# Patient Record
Sex: FEMALE | Race: AMERICAN INDIAN OR ALASKA NATIVE | NOT HISPANIC OR LATINO | RURAL
[De-identification: names, ages, dates, MRNs, and addresses within clinical notes are randomized per-mention and may not be internally consistent; named-entity substitution may affect disease eponyms.]

---

## 2024-04-30 ENCOUNTER — HOSPITAL ENCOUNTER (INPATIENT)
Facility: HOSPITAL | Age: 29
LOS: 2 days | Discharge: HOME | DRG: 819 | End: 2024-05-02
Attending: OBSTETRICS & GYNECOLOGY | Admitting: OBSTETRICS & GYNECOLOGY
Payer: COMMERCIAL

## 2024-04-30 ENCOUNTER — ROUTINE PRENATAL (OUTPATIENT)
Dept: OBSTETRICS AND GYNECOLOGY | Facility: CLINIC | Age: 29
End: 2024-04-30
Payer: COMMERCIAL

## 2024-04-30 ENCOUNTER — PROCEDURE VISIT (OUTPATIENT)
Dept: OBSTETRICS AND GYNECOLOGY | Facility: CLINIC | Age: 29
End: 2024-04-30
Attending: OBSTETRICS & GYNECOLOGY
Payer: COMMERCIAL

## 2024-04-30 VITALS — DIASTOLIC BLOOD PRESSURE: 79 MMHG | WEIGHT: 255.38 LBS | HEART RATE: 92 BPM | SYSTOLIC BLOOD PRESSURE: 124 MMHG

## 2024-04-30 DIAGNOSIS — O34.32 CERVICAL INCOMPETENCE DURING PREGNANCY IN SECOND TRIMESTER: ICD-10-CM

## 2024-04-30 DIAGNOSIS — O34.32 INCOMPETENT CERVIX IN PREGNANCY, ANTEPARTUM, SECOND TRIMESTER: ICD-10-CM

## 2024-04-30 DIAGNOSIS — Z36.9 ANTENATAL SCREENING ENCOUNTER: ICD-10-CM

## 2024-04-30 DIAGNOSIS — Z3A.27 27 WEEKS GESTATION OF PREGNANCY: ICD-10-CM

## 2024-04-30 DIAGNOSIS — O34.32 CERVICAL CERCLAGE SUTURE PRESENT IN SECOND TRIMESTER: ICD-10-CM

## 2024-04-30 DIAGNOSIS — Z36.9 ANTENATAL SCREENING ENCOUNTER: Primary | ICD-10-CM

## 2024-04-30 DIAGNOSIS — O34.32 PREMATURE CERVICAL DILATION IN SECOND TRIMESTER: Primary | ICD-10-CM

## 2024-04-30 LAB
BACTERIA VAG QL WET PREP: ABNORMAL /HPF
BASOPHILS # BLD AUTO: 0.04 K/UL (ref 0–0.2)
BASOPHILS NFR BLD AUTO: 0.3 % (ref 0–1)
BILIRUB SERPL-MCNC: NORMAL MG/DL
BILIRUB UR QL STRIP: NEGATIVE
BLOOD, POC UA: NORMAL
CLARITY UR: CLEAR
CLUE CELLS VAG QL WET PREP: ABNORMAL /HPF
COLOR UR: YELLOW
DIFFERENTIAL METHOD BLD: ABNORMAL
EOSINOPHIL # BLD AUTO: 0.07 K/UL (ref 0–0.5)
EOSINOPHIL NFR BLD AUTO: 0.6 % (ref 1–4)
ERYTHROCYTE [DISTWIDTH] IN BLOOD BY AUTOMATED COUNT: 13.1 % (ref 11.5–14.5)
GLUCOSE UR QL STRIP: NORMAL
GLUCOSE UR STRIP-MCNC: NORMAL MG/DL
HCT VFR BLD AUTO: 35.2 % (ref 38–47)
HGB BLD-MCNC: 11.1 G/DL (ref 12–16)
IMM GRANULOCYTES # BLD AUTO: 0.24 K/UL (ref 0–0.04)
IMM GRANULOCYTES NFR BLD: 1.9 % (ref 0–0.4)
INDIRECT COOMBS: NORMAL
KETONES UR QL STRIP: NORMAL
KETONES UR STRIP-SCNC: 60 MG/DL
LEUKOCYTE ESTERASE UR QL STRIP: NEGATIVE
LEUKOCYTE ESTERASE URINE, POC: NORMAL
LYMPHOCYTES # BLD AUTO: 1.47 K/UL (ref 1–4.8)
LYMPHOCYTES NFR BLD AUTO: 11.7 % (ref 27–41)
MCH RBC QN AUTO: 28.4 PG (ref 27–31)
MCHC RBC AUTO-ENTMCNC: 31.5 G/DL (ref 32–36)
MCV RBC AUTO: 90 FL (ref 80–96)
MONOCYTES # BLD AUTO: 0.6 K/UL (ref 0–0.8)
MONOCYTES NFR BLD AUTO: 4.8 % (ref 2–6)
MPC BLD CALC-MCNC: 11 FL (ref 9.4–12.4)
NEUTROPHILS # BLD AUTO: 10.11 K/UL (ref 1.8–7.7)
NEUTROPHILS NFR BLD AUTO: 80.7 % (ref 53–65)
NITRITE UR QL STRIP: NEGATIVE
NITRITE, POC UA: NORMAL
NRBC # BLD AUTO: 0 X10E3/UL
NRBC, AUTO (.00): 0 %
PH UR STRIP: 6.5 PH UNITS
PH, POC UA: 6
PLATELET # BLD AUTO: 237 K/UL (ref 150–400)
PROT UR QL STRIP: 20
PROTEIN, POC: NORMAL
RBC # BLD AUTO: 3.91 M/UL (ref 4.2–5.4)
RBC # UR STRIP: NEGATIVE /UL
RBC #/AREA VAG WET PREP: ABNORMAL /HPF
RH BLD: NORMAL
SP GR UR STRIP: 1.03
SPECIFIC GRAVITY, POC UA: 1.01
SPECIMEN OUTDATE: NORMAL
SQUAMOUS EPITHELIALS WET WOUNT, GENITAL: ABNORMAL /HPF
T VAGINALIS VAG QL WET PREP: ABNORMAL /HPF
UROBILINOGEN UR STRIP-ACNC: NORMAL MG/DL
UROBILINOGEN, POC UA: 0.2
WBC # BLD AUTO: 12.53 K/UL (ref 4.5–11)
WBC CLUMPS WET MOUNT, GENITAL: ABNORMAL /HPF
WBC VAG QL WET PREP: ABNORMAL /HPF
YEAST VAG QL WET PREP: ABNORMAL /HPF

## 2024-04-30 PROCEDURE — 76819 FETAL BIOPHYS PROFIL W/O NST: CPT | Mod: ,,, | Performed by: OBSTETRICS & GYNECOLOGY

## 2024-04-30 PROCEDURE — 99285 EMERGENCY DEPT VISIT HI MDM: CPT | Mod: 25

## 2024-04-30 PROCEDURE — 99499 UNLISTED E&M SERVICE: CPT | Mod: ,,, | Performed by: OBSTETRICS & GYNECOLOGY

## 2024-04-30 PROCEDURE — 76805 OB US >/= 14 WKS SNGL FETUS: CPT | Mod: ,,, | Performed by: OBSTETRICS & GYNECOLOGY

## 2024-04-30 PROCEDURE — 85025 COMPLETE CBC W/AUTO DIFF WBC: CPT | Performed by: OBSTETRICS & GYNECOLOGY

## 2024-04-30 PROCEDURE — 81003 URINALYSIS AUTO W/O SCOPE: CPT | Performed by: OBSTETRICS & GYNECOLOGY

## 2024-04-30 PROCEDURE — 87210 SMEAR WET MOUNT SALINE/INK: CPT | Performed by: OBSTETRICS & GYNECOLOGY

## 2024-04-30 PROCEDURE — 11000001 HC ACUTE MED/SURG PRIVATE ROOM

## 2024-04-30 PROCEDURE — 87591 N.GONORRHOEAE DNA AMP PROB: CPT | Performed by: OBSTETRICS & GYNECOLOGY

## 2024-04-30 PROCEDURE — 51702 INSERT TEMP BLADDER CATH: CPT

## 2024-04-30 PROCEDURE — 87653 STREP B DNA AMP PROBE: CPT | Performed by: OBSTETRICS & GYNECOLOGY

## 2024-04-30 PROCEDURE — 63600175 PHARM REV CODE 636 W HCPCS: Performed by: OBSTETRICS & GYNECOLOGY

## 2024-04-30 PROCEDURE — 86850 RBC ANTIBODY SCREEN: CPT | Performed by: OBSTETRICS & GYNECOLOGY

## 2024-04-30 PROCEDURE — 99214 OFFICE O/P EST MOD 30 MIN: CPT | Mod: ,,, | Performed by: OBSTETRICS & GYNECOLOGY

## 2024-04-30 PROCEDURE — 27000577 HC CATH, FOLEY TRAY W/ URINE METER

## 2024-04-30 PROCEDURE — 36415 COLL VENOUS BLD VENIPUNCTURE: CPT | Performed by: OBSTETRICS & GYNECOLOGY

## 2024-04-30 PROCEDURE — 86592 SYPHILIS TEST NON-TREP QUAL: CPT | Performed by: OBSTETRICS & GYNECOLOGY

## 2024-04-30 RX ORDER — PRENATAL WITH FERROUS FUM AND FOLIC ACID 3080; 920; 120; 400; 22; 1.84; 3; 20; 10; 1; 12; 200; 27; 25; 2 [IU]/1; [IU]/1; MG/1; [IU]/1; MG/1; MG/1; MG/1; MG/1; MG/1; MG/1; UG/1; MG/1; MG/1; MG/1; MG/1
1 TABLET ORAL DAILY
Status: DISCONTINUED | OUTPATIENT
Start: 2024-05-01 | End: 2024-05-02 | Stop reason: HOSPADM

## 2024-04-30 RX ORDER — ONDANSETRON HYDROCHLORIDE 2 MG/ML
4 INJECTION, SOLUTION INTRAVENOUS EVERY 6 HOURS PRN
Status: DISCONTINUED | OUTPATIENT
Start: 2024-04-30 | End: 2024-05-02

## 2024-04-30 RX ORDER — MAGNESIUM SULFATE HEPTAHYDRATE 40 MG/ML
4 INJECTION, SOLUTION INTRAVENOUS ONCE
Status: COMPLETED | OUTPATIENT
Start: 2024-04-30 | End: 2024-04-30

## 2024-04-30 RX ORDER — DIPHENHYDRAMINE HYDROCHLORIDE 50 MG/ML
25 INJECTION INTRAMUSCULAR; INTRAVENOUS EVERY 4 HOURS PRN
Status: DISCONTINUED | OUTPATIENT
Start: 2024-04-30 | End: 2024-05-02

## 2024-04-30 RX ORDER — BETAMETHASONE SODIUM PHOSPHATE AND BETAMETHASONE ACETATE 3; 3 MG/ML; MG/ML
12 INJECTION, SUSPENSION INTRA-ARTICULAR; INTRALESIONAL; INTRAMUSCULAR; SOFT TISSUE EVERY 24 HOURS
Status: DISPENSED | OUTPATIENT
Start: 2024-04-30 | End: 2024-05-02

## 2024-04-30 RX ORDER — AMOXICILLIN 250 MG
1 CAPSULE ORAL NIGHTLY PRN
Status: DISCONTINUED | OUTPATIENT
Start: 2024-04-30 | End: 2024-05-02 | Stop reason: HOSPADM

## 2024-04-30 RX ORDER — ONDANSETRON 4 MG/1
8 TABLET, ORALLY DISINTEGRATING ORAL EVERY 8 HOURS PRN
Status: DISCONTINUED | OUTPATIENT
Start: 2024-04-30 | End: 2024-05-02

## 2024-04-30 RX ORDER — SIMETHICONE 80 MG
1 TABLET,CHEWABLE ORAL EVERY 6 HOURS PRN
Status: DISCONTINUED | OUTPATIENT
Start: 2024-04-30 | End: 2024-05-02 | Stop reason: HOSPADM

## 2024-04-30 RX ORDER — MAGNESIUM SULFATE HEPTAHYDRATE 40 MG/ML
2 INJECTION, SOLUTION INTRAVENOUS CONTINUOUS
Status: DISCONTINUED | OUTPATIENT
Start: 2024-04-30 | End: 2024-05-02 | Stop reason: HOSPADM

## 2024-04-30 RX ORDER — SODIUM CHLORIDE, SODIUM LACTATE, POTASSIUM CHLORIDE, CALCIUM CHLORIDE 600; 310; 30; 20 MG/100ML; MG/100ML; MG/100ML; MG/100ML
INJECTION, SOLUTION INTRAVENOUS CONTINUOUS
Status: DISCONTINUED | OUTPATIENT
Start: 2024-04-30 | End: 2024-05-02 | Stop reason: HOSPADM

## 2024-04-30 RX ORDER — SODIUM CHLORIDE 0.9 % (FLUSH) 0.9 %
10 SYRINGE (ML) INJECTION
Status: DISCONTINUED | OUTPATIENT
Start: 2024-04-30 | End: 2024-05-02 | Stop reason: HOSPADM

## 2024-04-30 RX ORDER — CALCIUM GLUCONATE 98 MG/ML
1 INJECTION, SOLUTION INTRAVENOUS
Status: DISCONTINUED | OUTPATIENT
Start: 2024-04-30 | End: 2024-05-02 | Stop reason: HOSPADM

## 2024-04-30 RX ORDER — DIPHENHYDRAMINE HCL 25 MG
25 CAPSULE ORAL EVERY 4 HOURS PRN
Status: DISCONTINUED | OUTPATIENT
Start: 2024-04-30 | End: 2024-05-02

## 2024-04-30 RX ORDER — ACETAMINOPHEN 325 MG/1
650 TABLET ORAL EVERY 6 HOURS PRN
Status: DISCONTINUED | OUTPATIENT
Start: 2024-04-30 | End: 2024-05-02 | Stop reason: HOSPADM

## 2024-04-30 RX ADMIN — SODIUM CHLORIDE, POTASSIUM CHLORIDE, SODIUM LACTATE AND CALCIUM CHLORIDE: 600; 310; 30; 20 INJECTION, SOLUTION INTRAVENOUS at 05:04

## 2024-04-30 RX ADMIN — MAGNESIUM SULFATE HEPTAHYDRATE 4 G: 40 INJECTION, SOLUTION INTRAVENOUS at 05:04

## 2024-04-30 RX ADMIN — BETAMETHASONE SODIUM PHOSPHATE AND BETAMETHASONE ACETATE 12 MG: 3; 3 INJECTION, SUSPENSION INTRA-ARTICULAR; INTRALESIONAL; INTRAMUSCULAR at 04:04

## 2024-04-30 NOTE — HPI
Admit Diagnosis/Chief Complaint:  Premature cervical dilation  History           Chief Complaint   Patient presents with    Rule out PTL       Patient found to be dilated 2-3cm at clinic visit today. Denies contractions, abdominal pain            Pt of Dr Coleman ([facility])     28yo  at 27w4d sent from office secondary to CL 2.23 with funneling. Cervical exam was 2-3/50/-3.     Patient denies any vaginal bleeding, leakage of fluid or rhythmic contractions  Reports fetal movement     Denies any recent abdominal trauma  Denies abdominal /pelvic pain or cramping  Denies fever, chills, nausea, vomiting  Denies suprapubic or flank pain  Denies any abnormal vaginal discharge or odor  Denies urgency/frequency  Denies STIs     DOES report recent sexual intercourse                        Obstetric HPI:  Patient reports None contractions, active fetal movement, absent vaginal bleeding , absent loss of fluid      Objective:      Vital Signs (Most Recent):  Temp: 98.3 °F (36.8 °C) (24 1457)  Pulse: 104 (24 1457)  Resp: 20 (24 1457)  BP: 117/63 (24 1457)  SpO2: 100 % (24 1457) Vital Signs (24h Range):  Temp:  [98.3 °F (36.8 °C)] 98.3 °F (36.8 °C)  Pulse:  [] 104  Resp:  [20] 20  SpO2:  [100 %] 100 %  BP: (117-124)/(63-79) 117/63      Weight: 114.1 kg (251 lb 9.6 oz)  Body mass index is 46.02 kg/m².     FHT: 140 Cat 1 (reassuring)  TOCO:  Q no minutes     No intake or output data in the 24 hours ending 24 1608     Cervical Exam: (from office report and discussion with Dr Coleman)  Dilation:            2  Effacement:            50%  Station:            -3  Presentation:            Vertex      Significant Labs:  Recent Lab Results           24  1322         Bilirubin, POC neg          Spec Grav UA 1.015          Blood, UA trace          pH, UA 6.0          Protein, POC trace          Urobilinogen, UA 0.2          Nitrite, UA neg          Glucose, UA neg          Ketones, UA neg           WBC, UA neg                        Review of patient's allergies indicates:   Allergen Reactions    Penicillin         Pt states she has been allergic since childhood.      No past medical history on file.  No past surgical history on file.  No family history on file.  Social History   Social History            Tobacco Use    Smoking status: Never       Passive exposure: Never    Smokeless tobacco: Never   Substance Use Topics    Alcohol use: Never    Drug use: Never         Review of Systems   Constitutional:  Negative for chills and fever.   HENT:  Negative for congestion, dental problem, rhinorrhea, sinus pressure, sinus pain and sore throat.    Eyes:  Negative for photophobia and visual disturbance.   Respiratory:  Negative for chest tightness and shortness of breath.    Cardiovascular:  Negative for chest pain and palpitations.   Gastrointestinal:  Negative for abdominal pain, blood in stool, constipation, diarrhea, nausea and vomiting.   Genitourinary:  Negative for dysuria, genital sores, hematuria, pelvic pain, urgency, vaginal bleeding and vaginal discharge.   Musculoskeletal:  Negative for back pain.   Skin:  Negative for rash.   Neurological:  Negative for dizziness, seizures, syncope, weakness, light-headedness and headaches.   Hematological:  Does not bruise/bleed easily.         Physical Exam             Initial Vitals [04/30/24 1457]   BP Pulse Resp Temp SpO2   117/63 104 20 98.3 °F (36.8 °C) 100 %       MAP           --              Physical Exam     Constitutional: She appears well-developed and well-nourished. No distress.   HENT:   Head: Normocephalic and atraumatic.   Eyes: Pupils are equal, round, and reactive to light. No scleral icterus.   Neck: Neck supple.   Normal range of motion.  Cardiovascular:  Normal rate, regular rhythm and normal heart sounds.           Pulmonary/Chest: Breath sounds normal. No respiratory distress. She exhibits no tenderness.   Abdominal: Abdomen is soft. She  exhibits no distension. There is no abdominal tenderness.   Soft gravid no uterine tenderness There is no rebound and no guarding.   Genitourinary:    Vagina normal.      No vaginal discharge.      Musculoskeletal:         General: Normal range of motion.      Cervical back: Normal range of motion and neck supple.      Neurological: She is alert and oriented to person, place, and time. She has normal reflexes. She displays normal reflexes.            ED Course   Labs Reviewed - No data to display     Imaging Results    None            Medical Decision Making     Premature cervical dilation in office    Admit    Steroids for fetal lung maturity    Magnesium for neuro-protection / tocolysis           Amount and/or Complexity of Data Reviewed  Labs: ordered.        Medications - No data to display            Plan                Clinical Impression:   Final diagnoses:  [Z3A.27] 27 weeks gestation of pregnancy - noted  [O34.32] Premature cervical dilation in second trimester - Admit   Steroids for fetal lung maturity   Magnesium for neuro-protection / tocolysis (Primary)

## 2024-04-30 NOTE — ED NOTES
Patient presents to OB-ED after clinic visit d/t cervical dilation 2/50/-3 per Dr. Coleman. Patient reports that she had an ultrasound yesterday which showed that her cervix was dilated and that Dr. Coleman did internal cervical exam at clinic today. Denies abdominal pain, contractions, LOF, and vaginal bleeding. Denies any further complaints. Dr. Mosher informed of patient arrival and complaint. MD states he is contacting Dr. Coleman to discuss POC and will contact this RN after with orders.

## 2024-04-30 NOTE — PROGRESS NOTES
"29 y.o. female  at Unknown   Reports fetal movement or fluttering. Denies any vaginal bleeding, leakage of fluid, cramping, contractions, or pressure.   She complains of pelvic pain. Patient was referred from [facility].  Pt states she is doing well without any concerns.     Patient is to go to [facility] due to patient is dilated 2-3 cm upon cervical check.    LMP 10/21/2023; KATHY: 2024 27w 3d gestation    Vitals  BP: 124/79  Pulse: 92  Weight: 115.8 kg (255 lb 6.4 oz)  Prenatal  Movement: Present  Cervical Exam  Dilation: 2  Effacement (%): 50  Station: -3  Station (Labor Curve): 8 cm  Dilation/Effacement/Station  Dilation: 2  Effacement (%): 50  Station: -3    Prenatal Labs:  No results found for: "GROUPTRH", "INDCOGEL", "HGB", "HCT", "PLT", "SICKLE", "RUBELLAIMMUN", "HEPBSAG", "[id number]", "HIVIU", "ABSOLUTECD4", "RPR", "TREPPALIGG", "PRPQ", "LABCHLA", "LABNGO", "LABURIN", "QUADSCREEN", "OBGLUCOSESCR", "LABA1C", "STREPBCULT", "UPROTT", "[id number]"    No pregnancy checklist tasks were completed during this visit, and no tasks are pending completion.      Daily fetal kick counts, bleeding, and  labor/labor precautions discussed.  Questions answered to desired level of satisfaction  Verbalized understanding to all information and instructions provided.    Total weight gain/weight loss in pregnancy: Not found.     Follow up in about 1 week (around 2024) for MIA.    A: Unknown     ICD-10-CM ICD-9-CM    1.  screening encounter  Z36.9 V28.9 POCT Urinalysis      US OB 14+ Wks, TransAbd, Single Gestation      2. 27 weeks gestation of pregnancy  Z3A.27 V22.2 POCT Urinalysis      US OB 14+ Wks, TransAbd, Single Gestation      3. Incompetent cervix in pregnancy, antepartum, second trimester  O34.32 654.53 US OB 14+ Wks, TransAbd, Single Gestation            Urelaine Jared-Milligan, M.D., OG    OB/GYN          "

## 2024-04-30 NOTE — LETTER
April 30, 2024    Alyssa Painter  [address] [facility] [place] [address]             [facility] - OB/GYN  Obstetrics and Gynecology  [address] [place] [address] [place] [address]  Phone: [phone]  Fax: [phone]   April 30, 2024     Patient: Alyssa Painter   YOB: 1995   Date of Visit: 4/30/2024       To Whom it May Concern:    Alyssa Painter was seen in my clinic on 4/30/2024. She may return to work on 04/30/2024 .    Please excuse her from any classes or work missed.    If you have any questions or concerns, please don't hesitate to call.    Sincerely,         Ev Kitchen MA

## 2024-04-30 NOTE — LETTER
April 30, 2024    Alyssa Painter  [address] [facility] [place] [address]             [facility] - OB/GYN  Obstetrics and Gynecology  [address] [place] [address] [place] [address]  Phone: [phone]  Fax: [phone]   April 30, 2024     Patient: Alyssa Painter   YOB: 1995   Date of Visit: 4/30/2024       To Whom it May Concern:    Alyssa Painter was seen in my clinic on 4/30/2024. Jori Gastelum came with her to her appointment, he may return to work on 04/30/2024 .    Please excuse her from any classes or work missed.    If you have any questions or concerns, please don't hesitate to call.    Sincerely,         Ev Kitchen MA

## 2024-04-30 NOTE — H&P
[facility] -  Labor and Delivery  Obstetrics  History & Physical    Patient Name: Alyssa Painter  MRN: [id number]  Admission Date: 2024  Primary Care Provider: Dorothy, Primary Doctor    Subjective:     Principal Problem:Premature cervical dilation in second trimester    History of Present Illness:    Admit Diagnosis/Chief Complaint:  Premature cervical dilation  History           Chief Complaint   Patient presents with    Rule out PTL       Patient found to be dilated 2-3cm at clinic visit today. Denies contractions, abdominal pain            Pt of Dr Coleman ([facility])     30yo  at 27w4d sent from office secondary to CL 2.23 with funneling. Cervical exam was 2-3/50/-3.     Patient denies any vaginal bleeding, leakage of fluid or rhythmic contractions  Reports fetal movement     Denies any recent abdominal trauma  Denies abdominal /pelvic pain or cramping  Denies fever, chills, nausea, vomiting  Denies suprapubic or flank pain  Denies any abnormal vaginal discharge or odor  Denies urgency/frequency  Denies STIs     DOES report recent sexual intercourse                        Obstetric HPI:  Patient reports None contractions, active fetal movement, absent vaginal bleeding , absent loss of fluid      Objective:      Vital Signs (Most Recent):  Temp: 98.3 °F (36.8 °C) (24 1457)  Pulse: 104 (24 1457)  Resp: 20 (24 1457)  BP: 117/63 (24 1457)  SpO2: 100 % (24 1457) Vital Signs (24h Range):  Temp:  [98.3 °F (36.8 °C)] 98.3 °F (36.8 °C)  Pulse:  [] 104  Resp:  [20] 20  SpO2:  [100 %] 100 %  BP: (117-124)/(63-79) 117/63      Weight: 114.1 kg (251 lb 9.6 oz)  Body mass index is 46.02 kg/m².     FHT: 140 Cat 1 (reassuring)  TOCO:  Q no minutes     No intake or output data in the 24 hours ending 24 1608     Cervical Exam: (from office report and discussion with Dr Coleman)  Dilation:            2  Effacement:            50%  Station:            -3  Presentation:             Vertex      Significant Labs:  Recent Lab Results           04/30/24  1322         Bilirubin, POC neg          Spec Grav UA 1.015          Blood, UA trace          pH, UA 6.0          Protein, POC trace          Urobilinogen, UA 0.2          Nitrite, UA neg          Glucose, UA neg          Ketones, UA neg          WBC, UA neg                        Review of patient's allergies indicates:   Allergen Reactions    Penicillin         Pt states she has been allergic since childhood.      No past medical history on file.  No past surgical history on file.  No family history on file.  Social History   Social History            Tobacco Use    Smoking status: Never       Passive exposure: Never    Smokeless tobacco: Never   Substance Use Topics    Alcohol use: Never    Drug use: Never         Review of Systems   Constitutional:  Negative for chills and fever.   HENT:  Negative for congestion, dental problem, rhinorrhea, sinus pressure, sinus pain and sore throat.    Eyes:  Negative for photophobia and visual disturbance.   Respiratory:  Negative for chest tightness and shortness of breath.    Cardiovascular:  Negative for chest pain and palpitations.   Gastrointestinal:  Negative for abdominal pain, blood in stool, constipation, diarrhea, nausea and vomiting.   Genitourinary:  Negative for dysuria, genital sores, hematuria, pelvic pain, urgency, vaginal bleeding and vaginal discharge.   Musculoskeletal:  Negative for back pain.   Skin:  Negative for rash.   Neurological:  Negative for dizziness, seizures, syncope, weakness, light-headedness and headaches.   Hematological:  Does not bruise/bleed easily.         Physical Exam             Initial Vitals [04/30/24 1457]   BP Pulse Resp Temp SpO2   117/63 104 20 98.3 °F (36.8 °C) 100 %       MAP           --              Physical Exam     Constitutional: She appears well-developed and well-nourished. No distress.   HENT:   Head: Normocephalic and atraumatic.   Eyes: Pupils are  equal, round, and reactive to light. No scleral icterus.   Neck: Neck supple.   Normal range of motion.  Cardiovascular:  Normal rate, regular rhythm and normal heart sounds.           Pulmonary/Chest: Breath sounds normal. No respiratory distress. She exhibits no tenderness.   Abdominal: Abdomen is soft. She exhibits no distension. There is no abdominal tenderness.   Soft gravid no uterine tenderness There is no rebound and no guarding.   Genitourinary:    Vagina normal.      No vaginal discharge.      Musculoskeletal:         General: Normal range of motion.      Cervical back: Normal range of motion and neck supple.      Neurological: She is alert and oriented to person, place, and time. She has normal reflexes. She displays normal reflexes.            ED Course   Labs Reviewed - No data to display     Imaging Results    None            Medical Decision Making     Premature cervical dilation in office    Admit    Steroids for fetal lung maturity    Magnesium for neuro-protection / tocolysis           Amount and/or Complexity of Data Reviewed  Labs: ordered.        Medications - No data to display            Plan                Clinical Impression:   Final diagnoses:  [Z3A.27] 27 weeks gestation of pregnancy - noted  [O34.32] Premature cervical dilation in second trimester - Admit   Steroids for fetal lung maturity   Magnesium for neuro-protection / tocolysis (Primary)    Obstetric HPI:  Patient reports None contractions, active fetal movement, No vaginal bleeding , No loss of fluid     This pregnancy has been complicated by : uncomplicated    OB History    Para Term  AB Living   1 0 0 0 0 0   SAB IAB Ectopic Multiple Live Births   0 0 0 0 0      # Outcome Date GA Lbr Michael/2nd Weight Sex Type Anes PTL Lv   1 Current              No past medical history on file.  No past surgical history on file.    Current Facility-Administered Medications   Medication Dose Route Frequency Provider Last Rate Last  Admin    acetaminophen tablet 650 mg  650 mg Oral Q6H PRN Lorenzo Mosher MD        betamethasone acetate-betamethasone sodium phosphate injection 12 mg  12 mg Intramuscular Daily Lorenzo Mosher MD        calcium gluconate 100 mg/mL (10%) injection 1 g  1 g Intravenous PRN Lorenzo Mosher MD        diphenhydrAMINE capsule 25 mg  25 mg Oral Q4H PRN Lorenzo Mosher MD        diphenhydrAMINE injection 25 mg  25 mg Intravenous Q4H PRN Lorenzo Mosher MD        magnesium sulfate in water 40 gram/1,000 mL (4 %) bolus from bag 4 g 100 mL  4 g Intravenous Once Lorenzo Mosher MD        And    lactated ringers infusion   Intravenous Continuous Lorenzo Mosher MD        magnesium sulfate in water 40 gram/1,000 mL (4 %) infusion  2 g/hr Intravenous Continuous Lorenzo Mosher MD        ondansetron disintegrating tablet 8 mg  8 mg Oral Q8H PRN Lorenzo Mosher MD        ondansetron injection 4 mg  4 mg Intravenous Q6H PRN Lorenzo Mosher MD        [START ON 5/1/2024] prenatal vitamin oral tablet  1 tablet Oral Daily Lorenzo Mosher MD        senna-docusate 8.6-50 mg per tablet 1 tablet  1 tablet Oral Nightly PRN Lorenzo Mosher MD        simethicone chewable tablet 80 mg  1 tablet Oral Q6H PRN Lorenzo Mosher MD        sodium chloride 0.9% flush 10 mL  10 mL Intravenous PRN Lorenzo Mosher MD           Review of patient's allergies indicates:   Allergen Reactions    Penicillin      Pt states she has been allergic since childhood.        Family History    None       Tobacco Use    Smoking status: Never     Passive exposure: Never    Smokeless tobacco: Never   Substance and Sexual Activity    Alcohol use: Never    Drug use: Never    Sexual activity: Not Currently     Review of Systems   Objective:     Vital Signs (Most Recent):  Temp: 98.3 °F (36.8 °C) (04/30/24 1457)  Pulse: 104 (04/30/24 1457)  Resp: 20 (04/30/24 1457)  BP: 117/63 (04/30/24 1457)  SpO2: 100 % (04/30/24 1457) Vital Signs (24h Range):  Temp:  [98.3 °F (36.8 °C)]  "98.3 °F (36.8 °C)  Pulse:  [] 104  Resp:  [20] 20  SpO2:  [100 %] 100 %  BP: (117-124)/(63-79) 117/63     Weight: 114.1 kg (251 lb 9.6 oz)  Body mass index is 46.02 kg/m².    FHT: 140 Cat 1 (reassuring)  TOCO:  Q no minutes     Physical Exam     Cervix:  Dilation:  2  Effacement:  50%  Station: -3  Presentation: Vertex     Significant Labs:  No results found for: "GROUPTRH", "HEPBSAG", "RUBELLAIGGSC", "STREPBCULT", "AFP", "[id number]"    I have personallly reviewed all pertinent lab results from the last 24 hours.  Recent Lab Results         24  1322        Bilirubin, POC neg       Spec Grav UA 1.015       Blood, UA trace       pH, UA 6.0       Protein, POC trace       Urobilinogen, UA 0.2       Nitrite, UA neg       Glucose, UA neg       Ketones, UA neg       WBC, UA neg             Assessment/Plan:     29 y.o. female  at 27w4d for:    * Premature cervical dilation in second trimester    Admit to L&D Antepartum  Magnesium for neuroprotection   corticosteroids   Continuous fetal monitoring for now  GBS swab  Gc/CT  Wet prep    Consider vaginal progesterone      27 weeks gestation of pregnancy    Admit   See Premature cervical dilation entry        Lorenzo Mosher MD  Obstetrics  [facility] -  Labor and Delivery        "

## 2024-04-30 NOTE — ASSESSMENT & PLAN NOTE
Admit to L&D Antepartum  Magnesium for neuroprotection   corticosteroids   Continuous fetal monitoring for now  GBS swab  Gc/CT  Wet prep    Consider vaginal progesterone

## 2024-04-30 NOTE — SUBJECTIVE & OBJECTIVE
Obstetric HPI:  Patient reports None contractions, active fetal movement, No vaginal bleeding , No loss of fluid     This pregnancy has been complicated by : uncomplicated    OB History    Para Term  AB Living   1 0 0 0 0 0   SAB IAB Ectopic Multiple Live Births   0 0 0 0 0      # Outcome Date GA Lbr Michael/2nd Weight Sex Type Anes PTL Lv   1 Current              No past medical history on file.  No past surgical history on file.    Current Facility-Administered Medications   Medication Dose Route Frequency Provider Last Rate Last Admin    acetaminophen tablet 650 mg  650 mg Oral Q6H PRN Lorenzo Mosher MD        betamethasone acetate-betamethasone sodium phosphate injection 12 mg  12 mg Intramuscular Daily Lorenzo Mosher MD        calcium gluconate 100 mg/mL (10%) injection 1 g  1 g Intravenous PRN Lorenzo Mosher MD        diphenhydrAMINE capsule 25 mg  25 mg Oral Q4H PRN Lorenzo Mosher MD        diphenhydrAMINE injection 25 mg  25 mg Intravenous Q4H PRN Lorenzo Mosher MD        magnesium sulfate in water 40 gram/1,000 mL (4 %) bolus from bag 4 g 100 mL  4 g Intravenous Once Lorenzo Mosher MD        And    lactated ringers infusion   Intravenous Continuous Lorenzo Mosher MD        magnesium sulfate in water 40 gram/1,000 mL (4 %) infusion  2 g/hr Intravenous Continuous Lorenzo Mosher MD        ondansetron disintegrating tablet 8 mg  8 mg Oral Q8H PRN Lorenzo Mosher MD        ondansetron injection 4 mg  4 mg Intravenous Q6H PRN Lorenzo Mosher MD        [START ON 2024] prenatal vitamin oral tablet  1 tablet Oral Daily Lorenzo Mosher MD        senna-docusate 8.6-50 mg per tablet 1 tablet  1 tablet Oral Nightly PRN Lorenzo Mosher MD        simethicone chewable tablet 80 mg  1 tablet Oral Q6H PRN Lorenzo Mosher MD        sodium chloride 0.9% flush 10 mL  10 mL Intravenous PRN Lorenzo Mosher MD           Review of patient's allergies indicates:   Allergen Reactions    Penicillin      Pt states  "she has been allergic since childhood.        Family History    None       Tobacco Use    Smoking status: Never     Passive exposure: Never    Smokeless tobacco: Never   Substance and Sexual Activity    Alcohol use: Never    Drug use: Never    Sexual activity: Not Currently     Review of Systems   Objective:     Vital Signs (Most Recent):  Temp: 98.3 °F (36.8 °C) (04/30/24 1457)  Pulse: 104 (04/30/24 1457)  Resp: 20 (04/30/24 1457)  BP: 117/63 (04/30/24 1457)  SpO2: 100 % (04/30/24 1457) Vital Signs (24h Range):  Temp:  [98.3 °F (36.8 °C)] 98.3 °F (36.8 °C)  Pulse:  [] 104  Resp:  [20] 20  SpO2:  [100 %] 100 %  BP: (117-124)/(63-79) 117/63     Weight: 114.1 kg (251 lb 9.6 oz)  Body mass index is 46.02 kg/m².    FHT: 140 Cat 1 (reassuring)  TOCO:  Q no minutes     Physical Exam     Cervix:  Dilation:  2  Effacement:  50%  Station: -3  Presentation: Vertex     Significant Labs:  No results found for: "GROUPTRH", "HEPBSAG", "RUBELLAIGGSC", "STREPBCULT", "AFP", "PENDPYA3VC"    I have personallly reviewed all pertinent lab results from the last 24 hours.  Recent Lab Results         04/30/24  1322        Bilirubin, POC neg       Spec Grav UA 1.015       Blood, UA trace       pH, UA 6.0       Protein, POC trace       Urobilinogen, UA 0.2       Nitrite, UA neg       Glucose, UA neg       Ketones, UA neg       WBC, UA neg             "

## 2024-04-30 NOTE — PLAN OF CARE
Problem:  Fall Injury Risk  Goal: Absence of Fall, Infant Drop and Related Injury  Outcome: Progressing     Problem: Adult Inpatient Plan of Care  Goal: Plan of Care Review  Outcome: Progressing  Goal: Patient-Specific Goal (Individualized)  Outcome: Progressing  Goal: Absence of Hospital-Acquired Illness or Injury  Outcome: Progressing  Goal: Optimal Comfort and Wellbeing  Outcome: Progressing  Goal: Readiness for Transition of Care  Outcome: Progressing     Problem: Bariatric Environmental Safety  Goal: Safety Maintained with Care  Outcome: Progressing     Problem: Infection  Goal: Absence of Infection Signs and Symptoms  Outcome: Progressing     Problem:  Labor  Goal: Delayed  Delivery  Outcome: Progressing

## 2024-04-30 NOTE — ED PROVIDER NOTES
Encounter Date: 2024    FERN Physician: Lorenzo Mohser   Primary OBGYN:Dr. RamseyMilligan ([facility])    Admit Diagnosis/Chief Complaint:  Premature cervical dilation  History     Chief Complaint   Patient presents with    Rule out PTL     Patient found to be dilated 2-3cm at clinic visit today. Denies contractions, abdominal pain         Pt of Dr Coleman ([facility])    30yo  at 27w4d sent from office secondary to CL 2.23 with funneling. Cervical exam was 2-3/50/-3.    Patient denies any vaginal bleeding, leakage of fluid or rhythmic contractions  Reports fetal movement    Denies any recent abdominal trauma  Denies abdominal /pelvic pain or cramping  Denies fever, chills, nausea, vomiting  Denies suprapubic or flank pain  Denies any abnormal vaginal discharge or odor  Denies urgency/frequency  Denies STIs    DOES report recent sexual intercourse                 Obstetric HPI:  Patient reports None contractions, active fetal movement, absent vaginal bleeding , absent loss of fluid      Objective:     Vital Signs (Most Recent):  Temp: 98.3 °F (36.8 °C) (24 1457)  Pulse: 104 (24 1457)  Resp: 20 (24 1457)  BP: 117/63 (24 1457)  SpO2: 100 % (24 1457) Vital Signs (24h Range):  Temp:  [98.3 °F (36.8 °C)] 98.3 °F (36.8 °C)  Pulse:  [] 104  Resp:  [20] 20  SpO2:  [100 %] 100 %  BP: (117-124)/(63-79) 117/63     Weight: 114.1 kg (251 lb 9.6 oz)  Body mass index is 46.02 kg/m².    FHT: 140 Cat 1 (reassuring)  TOCO:  Q no minutes    No intake or output data in the 24 hours ending 24 1608    Cervical Exam: (from office report and discussion with Dr Coleman)  Dilation:  2  Effacement:  50%  Station: -3  Presentation: Vertex     Significant Labs:  Recent Lab Results         24  1322        Bilirubin, POC neg       Spec Grav UA 1.015       Blood, UA trace       pH, UA 6.0       Protein, POC trace       Urobilinogen, UA 0.2       Nitrite, UA neg       Glucose, UA neg        Ketones, UA neg       WBC, UA neg             Review of patient's allergies indicates:   Allergen Reactions    Penicillin      Pt states she has been allergic since childhood.     No past medical history on file.  No past surgical history on file.  No family history on file.  Social History     Tobacco Use    Smoking status: Never     Passive exposure: Never    Smokeless tobacco: Never   Substance Use Topics    Alcohol use: Never    Drug use: Never     Review of Systems   Constitutional:  Negative for chills and fever.   HENT:  Negative for congestion, dental problem, rhinorrhea, sinus pressure, sinus pain and sore throat.    Eyes:  Negative for photophobia and visual disturbance.   Respiratory:  Negative for chest tightness and shortness of breath.    Cardiovascular:  Negative for chest pain and palpitations.   Gastrointestinal:  Negative for abdominal pain, blood in stool, constipation, diarrhea, nausea and vomiting.   Genitourinary:  Negative for dysuria, genital sores, hematuria, pelvic pain, urgency, vaginal bleeding and vaginal discharge.   Musculoskeletal:  Negative for back pain.   Skin:  Negative for rash.   Neurological:  Negative for dizziness, seizures, syncope, weakness, light-headedness and headaches.   Hematological:  Does not bruise/bleed easily.       Physical Exam     Initial Vitals [04/30/24 1457]   BP Pulse Resp Temp SpO2   117/63 104 20 98.3 °F (36.8 °C) 100 %      MAP       --         Physical Exam    Constitutional: She appears well-developed and well-nourished. No distress.   HENT:   Head: Normocephalic and atraumatic.   Eyes: Pupils are equal, round, and reactive to light. No scleral icterus.   Neck: Neck supple.   Normal range of motion.  Cardiovascular:  Normal rate, regular rhythm and normal heart sounds.           Pulmonary/Chest: Breath sounds normal. No respiratory distress. She exhibits no tenderness.   Abdominal: Abdomen is soft. She exhibits no distension. There is no abdominal  tenderness.   Soft gravid no uterine tenderness There is no rebound and no guarding.   Genitourinary:    Vagina normal.      No vaginal discharge.     Musculoskeletal:         General: Normal range of motion.      Cervical back: Normal range of motion and neck supple.     Neurological: She is alert and oriented to person, place, and time. She has normal reflexes. She displays normal reflexes.         ED Course   Labs Reviewed - No data to display     Imaging Results    None          Medical Decision Making    Premature cervical dilation in office    Admit    Steroids for fetal lung maturity    Magnesium for neuro-protection / tocolysis        Amount and/or Complexity of Data Reviewed  Labs: ordered.       Medications - No data to display                           Clinical Impression:   Final diagnoses:  [Z3A.27] 27 weeks gestation of pregnancy - noted  [O34.32] Premature cervical dilation in second trimester - Admit   Steroids for fetal lung maturity   Magnesium for neuro-protection / tocolysis (Primary)        ED Disposition Condition    Admit Stable                Lorenzo Mosher MD  04/30/24 0392

## 2024-05-01 LAB
CHLAMYDIA BY PCR: NEGATIVE
GROUP B STREP, PCR: NEGATIVE
MAGNESIUM SERPL-MCNC: 5.1 MG/DL (ref 1.7–2.3)
MAGNESIUM SERPL-MCNC: 5.6 MG/DL (ref 1.7–2.3)
MAGNESIUM SERPL-MCNC: 5.7 MG/DL (ref 1.7–2.3)
MAGNESIUM SERPL-MCNC: 5.9 MG/DL (ref 1.7–2.3)
N. GONORRHOEAE (GC) BY PCR: NEGATIVE
RPR SER-TITR: NORMAL {TITER}

## 2024-05-01 PROCEDURE — 36415 COLL VENOUS BLD VENIPUNCTURE: CPT | Performed by: OBSTETRICS & GYNECOLOGY

## 2024-05-01 PROCEDURE — 63600175 PHARM REV CODE 636 W HCPCS: Performed by: OBSTETRICS & GYNECOLOGY

## 2024-05-01 PROCEDURE — 83735 ASSAY OF MAGNESIUM: CPT | Performed by: OBSTETRICS & GYNECOLOGY

## 2024-05-01 PROCEDURE — 11000001 HC ACUTE MED/SURG PRIVATE ROOM

## 2024-05-01 PROCEDURE — 25000003 PHARM REV CODE 250: Performed by: OBSTETRICS & GYNECOLOGY

## 2024-05-01 RX ORDER — BETAMETHASONE SODIUM PHOSPHATE AND BETAMETHASONE ACETATE 3; 3 MG/ML; MG/ML
12 INJECTION, SUSPENSION INTRA-ARTICULAR; INTRALESIONAL; INTRAMUSCULAR; SOFT TISSUE ONCE
Status: COMPLETED | OUTPATIENT
Start: 2024-05-01 | End: 2024-05-01

## 2024-05-01 RX ADMIN — MAGNESIUM SULFATE HEPTAHYDRATE 2 G/HR: 40 INJECTION, SOLUTION INTRAVENOUS at 12:05

## 2024-05-01 RX ADMIN — Medication 1 TABLET: at 07:05

## 2024-05-01 RX ADMIN — SODIUM CHLORIDE, POTASSIUM CHLORIDE, SODIUM LACTATE AND CALCIUM CHLORIDE: 600; 310; 30; 20 INJECTION, SOLUTION INTRAVENOUS at 07:05

## 2024-05-01 RX ADMIN — BETAMETHASONE SODIUM PHOSPHATE AND BETAMETHASONE ACETATE 12 MG: 3; 3 INJECTION, SUSPENSION INTRA-ARTICULAR; INTRALESIONAL; INTRAMUSCULAR at 05:05

## 2024-05-01 NOTE — PLAN OF CARE
[facility] -  Labor and Delivery  Initial Discharge Assessment       Primary Care Provider: Dorothy, Primary Doctor    Admission Diagnosis: 27 weeks gestation of pregnancy [Z3A.27]  Premature cervical dilation in second trimester [O34.32]    Admission Date: 4/30/2024  Expected Discharge Date:          Payor: BLUE CROSS BLUE [employer]IELD / Plan: [facility] FEDERAL BASIC / Product Type: PPO /     Extended Emergency Contact Information  Primary Emergency Contact: TOLU CHRISTINA  Mobile Phone: [phone]  Relation: Other   needed? No    Discharge Plan A: Home with family  Discharge Plan B: Home with family      [facility] [address] [address]  Phone: [phone] Fax: [phone]    SS spoke with pt at bedside. Pt states she lives at home with her significant other and plans to return when medically ready for discharge. Pt states they do not have all needs for baby at this time, but plan to obtain prior to due date. Pt entered into [employer] portal. SS will fax discharge info to [employer] portal at discharge. SS following for additional needs.

## 2024-05-02 ENCOUNTER — ANESTHESIA (OUTPATIENT)
Dept: SURGERY | Facility: HOSPITAL | Age: 29
DRG: 819 | End: 2024-05-02
Payer: COMMERCIAL

## 2024-05-02 ENCOUNTER — ANESTHESIA EVENT (OUTPATIENT)
Dept: SURGERY | Facility: HOSPITAL | Age: 29
DRG: 819 | End: 2024-05-02
Payer: COMMERCIAL

## 2024-05-02 VITALS
HEART RATE: 83 BPM | OXYGEN SATURATION: 100 % | DIASTOLIC BLOOD PRESSURE: 58 MMHG | WEIGHT: 251.63 LBS | RESPIRATION RATE: 17 BRPM | BODY MASS INDEX: 46.31 KG/M2 | HEIGHT: 62 IN | TEMPERATURE: 98 F | SYSTOLIC BLOOD PRESSURE: 126 MMHG

## 2024-05-02 PROBLEM — O34.32 CERVICAL CERCLAGE SUTURE PRESENT IN SECOND TRIMESTER: Status: ACTIVE | Noted: 2024-05-02

## 2024-05-02 PROCEDURE — 59320 REVISION OF CERVIX: CPT | Mod: ,,, | Performed by: OBSTETRICS & GYNECOLOGY

## 2024-05-02 PROCEDURE — 27201960 HC SPINAL TRAY: Performed by: NURSE ANESTHETIST, CERTIFIED REGISTERED

## 2024-05-02 PROCEDURE — 63600175 PHARM REV CODE 636 W HCPCS: Mod: JZ,JG | Performed by: ANESTHESIOLOGY

## 2024-05-02 PROCEDURE — D9220A PRA ANESTHESIA: Mod: CRNA,,, | Performed by: NURSE ANESTHETIST, CERTIFIED REGISTERED

## 2024-05-02 PROCEDURE — 36000706: Performed by: OBSTETRICS & GYNECOLOGY

## 2024-05-02 PROCEDURE — 99900035 HC TECH TIME PER 15 MIN (STAT)

## 2024-05-02 PROCEDURE — 27000716 HC OXISENSOR PROBE, ANY SIZE: Performed by: NURSE ANESTHETIST, CERTIFIED REGISTERED

## 2024-05-02 PROCEDURE — 37000009 HC ANESTHESIA EA ADD 15 MINS: Performed by: OBSTETRICS & GYNECOLOGY

## 2024-05-02 PROCEDURE — 0UVC7ZZ RESTRICTION OF CERVIX, VIA NATURAL OR ARTIFICIAL OPENING: ICD-10-PCS | Performed by: OBSTETRICS & GYNECOLOGY

## 2024-05-02 PROCEDURE — 63600175 PHARM REV CODE 636 W HCPCS: Performed by: OBSTETRICS & GYNECOLOGY

## 2024-05-02 PROCEDURE — 25000003 PHARM REV CODE 250: Performed by: NURSE ANESTHETIST, CERTIFIED REGISTERED

## 2024-05-02 PROCEDURE — D9220A PRA ANESTHESIA: Mod: ANES,,, | Performed by: ANESTHESIOLOGY

## 2024-05-02 PROCEDURE — 37000008 HC ANESTHESIA 1ST 15 MINUTES: Performed by: OBSTETRICS & GYNECOLOGY

## 2024-05-02 PROCEDURE — 36000707: Performed by: OBSTETRICS & GYNECOLOGY

## 2024-05-02 RX ORDER — SODIUM CHLORIDE, SODIUM LACTATE, POTASSIUM CHLORIDE, CALCIUM CHLORIDE 600; 310; 30; 20 MG/100ML; MG/100ML; MG/100ML; MG/100ML
INJECTION, SOLUTION INTRAVENOUS CONTINUOUS
Status: DISCONTINUED | OUTPATIENT
Start: 2024-05-02 | End: 2024-05-02 | Stop reason: HOSPADM

## 2024-05-02 RX ORDER — DIPHENHYDRAMINE HCL 25 MG
25 CAPSULE ORAL EVERY 4 HOURS PRN
Status: DISCONTINUED | OUTPATIENT
Start: 2024-05-02 | End: 2024-05-02 | Stop reason: HOSPADM

## 2024-05-02 RX ORDER — DIPHENHYDRAMINE HYDROCHLORIDE 50 MG/ML
25 INJECTION INTRAMUSCULAR; INTRAVENOUS EVERY 4 HOURS PRN
Status: DISCONTINUED | OUTPATIENT
Start: 2024-05-02 | End: 2024-05-02 | Stop reason: HOSPADM

## 2024-05-02 RX ORDER — EPHEDRINE SULFATE 50 MG/ML
INJECTION, SOLUTION INTRAVENOUS
Status: DISCONTINUED | OUTPATIENT
Start: 2024-05-02 | End: 2024-05-02

## 2024-05-02 RX ORDER — ONDANSETRON 4 MG/1
8 TABLET, ORALLY DISINTEGRATING ORAL EVERY 8 HOURS PRN
Status: DISCONTINUED | OUTPATIENT
Start: 2024-05-02 | End: 2024-05-02 | Stop reason: HOSPADM

## 2024-05-02 RX ORDER — IPRATROPIUM BROMIDE AND ALBUTEROL SULFATE 2.5; .5 MG/3ML; MG/3ML
3 SOLUTION RESPIRATORY (INHALATION) ONCE AS NEEDED
Status: DISCONTINUED | OUTPATIENT
Start: 2024-05-02 | End: 2024-05-02 | Stop reason: HOSPADM

## 2024-05-02 RX ORDER — DIPHENHYDRAMINE HYDROCHLORIDE 50 MG/ML
25 INJECTION INTRAMUSCULAR; INTRAVENOUS EVERY 6 HOURS PRN
Status: DISCONTINUED | OUTPATIENT
Start: 2024-05-02 | End: 2024-05-02

## 2024-05-02 RX ORDER — BUPIVACAINE HYDROCHLORIDE 7.5 MG/ML
INJECTION, SOLUTION EPIDURAL; RETROBULBAR
Status: COMPLETED | OUTPATIENT
Start: 2024-05-02 | End: 2024-05-02

## 2024-05-02 RX ORDER — ONDANSETRON HYDROCHLORIDE 2 MG/ML
4 INJECTION, SOLUTION INTRAVENOUS DAILY PRN
Status: DISCONTINUED | OUTPATIENT
Start: 2024-05-02 | End: 2024-05-02 | Stop reason: HOSPADM

## 2024-05-02 RX ORDER — HYDROCODONE BITARTRATE AND ACETAMINOPHEN 5; 325 MG/1; MG/1
1 TABLET ORAL EVERY 4 HOURS PRN
Status: DISCONTINUED | OUTPATIENT
Start: 2024-05-02 | End: 2024-05-02 | Stop reason: HOSPADM

## 2024-05-02 RX ADMIN — SODIUM CHLORIDE, POTASSIUM CHLORIDE, SODIUM LACTATE AND CALCIUM CHLORIDE: 600; 310; 30; 20 INJECTION, SOLUTION INTRAVENOUS at 01:05

## 2024-05-02 RX ADMIN — EPHEDRINE SULFATE 50 MG: 50 INJECTION INTRAVENOUS at 11:05

## 2024-05-02 RX ADMIN — BUPIVACAINE HYDROCHLORIDE 1.4 ML: 7.5 INJECTION, SOLUTION EPIDURAL; RETROBULBAR at 10:05

## 2024-05-02 NOTE — PROGRESS NOTES
[facility] -  Labor and Delivery  Obstetrics  Antepartum Progress Note    Patient Name: Alyssa Painter  MRN: [id number]  Admission Date: 2024  Hospital Length of Stay: 2 days  Attending Physician: Amol Romero MD  Primary Care Provider: Dorothy, Primary Doctor    Subjective:     Principal Problem:Cervical incompetence during pregnancy in second trimester    HPI:    Admit Diagnosis/Chief Complaint:  Premature cervical dilation  History           Chief Complaint   Patient presents with    Rule out PTL       Patient found to be dilated 2-3cm at clinic visit today. Denies contractions, abdominal pain            Pt of Dr Coleman ([facility])     30yo  at 27w4d sent from office secondary to CL 2.23 with funneling. Cervical exam was 2-3/50/-3.     Patient denies any vaginal bleeding, leakage of fluid or rhythmic contractions  Reports fetal movement     Denies any recent abdominal trauma  Denies abdominal /pelvic pain or cramping  Denies fever, chills, nausea, vomiting  Denies suprapubic or flank pain  Denies any abnormal vaginal discharge or odor  Denies urgency/frequency  Denies STIs     DOES report recent sexual intercourse                        Obstetric HPI:  Patient reports None contractions, active fetal movement, absent vaginal bleeding , absent loss of fluid      Objective:      Vital Signs (Most Recent):  Temp: 98.3 °F (36.8 °C) (24 1457)  Pulse: 104 (24 1457)  Resp: 20 (24 1457)  BP: 117/63 (24 1457)  SpO2: 100 % (24 1457) Vital Signs (24h Range):  Temp:  [98.3 °F (36.8 °C)] 98.3 °F (36.8 °C)  Pulse:  [] 104  Resp:  [20] 20  SpO2:  [100 %] 100 %  BP: (117-124)/(63-79) 117/63      Weight: 114.1 kg (251 lb 9.6 oz)  Body mass index is 46.02 kg/m².     FHT: 140 Cat 1 (reassuring)  TOCO:  Q no minutes     No intake or output data in the 24 hours ending 24 1608     Cervical Exam: (from office report and discussion with Dr Coleman)  Dilation:             2  Effacement:            50%  Station:            -3  Presentation:            Vertex      Significant Labs:  Recent Lab Results           04/30/24  1322         Bilirubin, POC neg          Spec Grav UA 1.015          Blood, UA trace          pH, UA 6.0          Protein, POC trace          Urobilinogen, UA 0.2          Nitrite, UA neg          Glucose, UA neg          Ketones, UA neg          WBC, UA neg                        Review of patient's allergies indicates:   Allergen Reactions    Penicillin         Pt states she has been allergic since childhood.      No past medical history on file.  No past surgical history on file.  No family history on file.  Social History   Social History            Tobacco Use    Smoking status: Never       Passive exposure: Never    Smokeless tobacco: Never   Substance Use Topics    Alcohol use: Never    Drug use: Never         Review of Systems   Constitutional:  Negative for chills and fever.   HENT:  Negative for congestion, dental problem, rhinorrhea, sinus pressure, sinus pain and sore throat.    Eyes:  Negative for photophobia and visual disturbance.   Respiratory:  Negative for chest tightness and shortness of breath.    Cardiovascular:  Negative for chest pain and palpitations.   Gastrointestinal:  Negative for abdominal pain, blood in stool, constipation, diarrhea, nausea and vomiting.   Genitourinary:  Negative for dysuria, genital sores, hematuria, pelvic pain, urgency, vaginal bleeding and vaginal discharge.   Musculoskeletal:  Negative for back pain.   Skin:  Negative for rash.   Neurological:  Negative for dizziness, seizures, syncope, weakness, light-headedness and headaches.   Hematological:  Does not bruise/bleed easily.         Physical Exam             Initial Vitals [04/30/24 1457]   BP Pulse Resp Temp SpO2   117/63 104 20 98.3 °F (36.8 °C) 100 %       MAP           --              Physical Exam     Constitutional: She appears well-developed and  well-nourished. No distress.   HENT:   Head: Normocephalic and atraumatic.   Eyes: Pupils are equal, round, and reactive to light. No scleral icterus.   Neck: Neck supple.   Normal range of motion.  Cardiovascular:  Normal rate, regular rhythm and normal heart sounds.           Pulmonary/Chest: Breath sounds normal. No respiratory distress. She exhibits no tenderness.   Abdominal: Abdomen is soft. She exhibits no distension. There is no abdominal tenderness.   Soft gravid no uterine tenderness There is no rebound and no guarding.   Genitourinary:    Vagina normal.      No vaginal discharge.      Musculoskeletal:         General: Normal range of motion.      Cervical back: Normal range of motion and neck supple.      Neurological: She is alert and oriented to person, place, and time. She has normal reflexes. She displays normal reflexes.            ED Course   Labs Reviewed - No data to display     Imaging Results    None            Medical Decision Making     Premature cervical dilation in office    Admit    Steroids for fetal lung maturity    Magnesium for neuro-protection / tocolysis           Amount and/or Complexity of Data Reviewed  Labs: ordered.        Medications - No data to display            Plan                Clinical Impression:   Final diagnoses:  [Z3A.27] 27 weeks gestation of pregnancy - noted  [O34.32] Premature cervical dilation in second trimester - Admit   Steroids for fetal lung maturity   Magnesium for neuro-protection / tocolysis (Primary)    Hospital Course:  Pt has received 2 doses of Betamethasone and 24 hours of MgSO4 for neuro-protection. Pt desires cerclage this morning.    Obstetric HPI:  Patient reports None contractions, active fetal movement, absent vaginal bleeding , absent loss of fluid      Objective:     Vital Signs (Most Recent):  Temp: 96.3 °F (35.7 °C) (05/02/24 0103)  Pulse: 85 (05/02/24 0103)  Resp: 18 (05/02/24 0103)  BP: (!) 110/55 (05/02/24 0103)  SpO2: 98 % (05/02/24  0103) Vital Signs (24h Range):  Temp:  [96.3 °F (35.7 °C)-98.1 °F (36.7 °C)] 96.3 °F (35.7 °C)  Pulse:  [] 85  Resp:  [18-20] 18  SpO2:  [93 %-100 %] 98 %  BP: ()/(49-77) 110/55     Weight: 114.1 kg (251 lb 9.6 oz)  Body mass index is 46.02 kg/m².    FHT: 140sCat 1 (reassuring)  TOCO:  Q 0 minutes      Intake/Output Summary (Last 24 hours) at 5/2/2024 0757  Last data filed at 5/1/2024 1936  Gross per 24 hour   Intake --   Output 2950 ml   Net -2950 ml       Cervical Exam:  Dilation:  3  Effacement:  75%  Station: -3  Presentation: Vertex     Significant Labs:  Recent Lab Results         05/01/24  1758   05/01/24  1139        Magnesium  5.6   5.9               Physical Exam:   Constitutional: She is oriented to person, place, and time. She appears well-developed and well-nourished.    HENT:   Head: Normocephalic and atraumatic.    Eyes: Pupils are equal, round, and reactive to light. EOM are normal.     Cardiovascular:  Normal rate, regular rhythm and normal heart sounds.             Pulmonary/Chest: Effort normal and breath sounds normal. Right breast exhibits no inverted nipple, no mass, no nipple discharge, no skin change, no tenderness, no bleeding and no swelling. Left breast exhibits no inverted nipple, no mass, no nipple discharge, no skin change, no tenderness, no bleeding and no swelling. Breasts are symmetrical.        Abdominal: Soft. Bowel sounds are normal.     Genitourinary:    Vagina and uterus normal.             Musculoskeletal: Normal range of motion and moves all extremeties.       Neurological: She is alert and oriented to person, place, and time. She has normal reflexes.     Psychiatric: She has a normal mood and affect. Her behavior is normal. Judgment and thought content normal.       Review of Systems   Constitutional:  Negative for activity change, appetite change, fatigue and unexpected weight change.   HENT: Negative.     Respiratory:  Negative for cough, shortness of breath and  wheezing.    Cardiovascular:  Negative for chest pain, palpitations and leg swelling.   Gastrointestinal:  Negative for abdominal pain, bloating, blood in stool, constipation, diarrhea, nausea, vomiting and reflux.   Genitourinary:  Negative for bladder incontinence, decreased libido, dysmenorrhea, dyspareunia, dysuria, flank pain, frequency, menorrhagia, menstrual problem, pelvic pain, urgency, vaginal bleeding, vaginal discharge, postcoital bleeding and vaginal odor.   Musculoskeletal:  Negative for back pain.   Integumentary:  Negative for rash, acne, hair changes, mole/lesion, breast mass, nipple discharge, breast skin changes and breast tenderness.   Neurological:  Negative for headaches.   Psychiatric/Behavioral:  Negative for depression and sleep disturbance. The patient is not nervous/anxious.    Breast: Negative for asymmetry, breast self exam, lump, mass, nipple discharge, skin changes and tenderness    Assessment/Plan:     29 y.o. female  at 27w6d for:    * Cervical incompetence during pregnancy in second trimester    Admit to L&D Antepartum  Magnesium for neuroprotection   corticosteroids   Continuous fetal monitoring for now  GBS swab  Gc/CT  Wet prep        Pt scheduled for cervical cerclage this morning  Risks, beenfits and alternatives reviewed with the pt to include, but not limited to bleeding,  labor, PROM and  birth.  All questions answered to the level of the patient's understanding  Consider vaginal progesterone      27 weeks gestation of pregnancy    Admit   See Premature cervical dilation entry          Amol Romero MD  Obstetrics  [facility] -  Labor and Delivery

## 2024-05-02 NOTE — OP NOTE
DATE OF PROCEDURE:    PREOPERATIVE DIAGNOSES:   1. IUP@ 27 weeks  2. Cervical incompetence         POSTOPERATIVE DIAGNOSES:   1. same      PROCEDURE: Patterson cervical cerclage    SURGEON: Amol Romero MD    ASSISTANT: none    ANESTHESIA: general    ESTIMATED BLOOD LOSS: minimal mL.     COMPLICATIONS: None.     FINDINGS: shortened cervix 2 cm long, 1 cm dilated    PROCEDURE IN DETAIL:     After informed consent was obtained, the patient was taken to the Operating Room, at which time a general anesthesia was administered. The patient was placed in the [facility] and prepped and draped in the usual sterile fashion. A red rubber catheter was used to decompress the bladder, and approximately 100mL of urine was removed. Next, a weighted speculum was placed into the posterior cul-de-sac. Then using a right angle retractor, the cervix was well visualized. The ring forceps was used to grasp the anterior lip of the cervix. Then using a 1.0 Mersiline suture, a   pursestring Patterson cerclage was placed in the usual fashion near the level of the internal os. This stitch was carried circumferentially in the usual manner with the knot at 3 o'clock. The suture was cinched, and approximately 7 throws of the knot were placed.     After the procedure, a digital exam was performed to confirm adequate cervical length and a closed cervix. [facility] Room in stable condition. All laps, needles, and sponge counts were correct x2 at the conclusion of the case.   The patient did  receive preoperative antibiotics.

## 2024-05-02 NOTE — INTERVAL H&P NOTE
The patient has been examined and the H&P has been reviewed:    I concur with the findings and no changes have occurred since H&P was written.        Active Hospital Problems    Diagnosis  POA    *Premature cervical dilation in second trimester [O34.32]  Unknown    27 weeks gestation of pregnancy [Z3A.27]  Not Applicable      Resolved Hospital Problems   No resolved problems to display.

## 2024-05-02 NOTE — HOSPITAL COURSE
This is a 29-year-old prima  admitted at 27 weeks gestation secondary to cervical shortening and dilation consistent with cervical incompetence.  Pt has received 2 doses of Betamethasone and 24 hours of MgSO4 for neuro-protection. Pt desires cerclage that was placed on 2024.  Her procedure occurred without complication or incident.  Patient was therefore discharged to home following cerclage placement and observation in stable condition.  Disposition was to home.  Patient was placed on pelvic rest and instructed follow up with Dr. Coleman in 1 week.

## 2024-05-02 NOTE — ANESTHESIA POSTPROCEDURE EVALUATION
Anesthesia Post Evaluation    Patient: Alyssa Painter    Procedure(s) Performed: Procedure(s) (LRB):  CERCLAGE, CERVIX (N/A)    Final Anesthesia Type: spinal      Patient location during evaluation: labor & delivery  Patient participation: Yes- Able to Participate  Level of consciousness: awake and alert and oriented  Post-procedure vital signs: reviewed and stable  Pain management: adequate  Airway patency: patent  DARIANA mitigation strategies: Use of major conduction anesthesia (spinal/epidural) or peripheral nerve block  PONV status at discharge: No PONV  Anesthetic complications: no      Cardiovascular status: hemodynamically stable  Respiratory status: unassisted and spontaneous ventilation  Hydration status: euvolemic  Follow-up not needed.              Vitals Value Taken Time   /59 05/02/24 0750   Temp 36.4 °C (97.5 °F) 05/02/24 0750   Pulse 69 05/02/24 0750   Resp 18 05/02/24 0103   SpO2 98 % 05/02/24 0103         No case tracking events are documented in the log.      Pain/Go Score: No data recorded

## 2024-05-02 NOTE — ASSESSMENT & PLAN NOTE
Admit to L&D Antepartum  Magnesium for neuroprotection   corticosteroids   Continuous fetal monitoring for now  GBS swab  Gc/CT  Wet prep        Pt scheduled for cervical cerclage this morning  Risks, beenfits and alternatives reviewed with the pt to include, but not limited to bleeding,  labor, PROM and  birth.  All questions answered to the level of the patient's understanding  Consider vaginal progesterone

## 2024-05-02 NOTE — SUBJECTIVE & OBJECTIVE
Obstetric HPI:  Patient reports None contractions, active fetal movement, absent vaginal bleeding , absent loss of fluid      Objective:     Vital Signs (Most Recent):  Temp: 96.3 °F (35.7 °C) (05/02/24 0103)  Pulse: 85 (05/02/24 0103)  Resp: 18 (05/02/24 0103)  BP: (!) 110/55 (05/02/24 0103)  SpO2: 98 % (05/02/24 0103) Vital Signs (24h Range):  Temp:  [96.3 °F (35.7 °C)-98.1 °F (36.7 °C)] 96.3 °F (35.7 °C)  Pulse:  [] 85  Resp:  [18-20] 18  SpO2:  [93 %-100 %] 98 %  BP: ()/(49-77) 110/55     Weight: 114.1 kg (251 lb 9.6 oz)  Body mass index is 46.02 kg/m².    FHT: 140sCat 1 (reassuring)  TOCO:  Q 0 minutes      Intake/Output Summary (Last 24 hours) at 5/2/2024 0757  Last data filed at 5/1/2024 1936  Gross per 24 hour   Intake --   Output 2950 ml   Net -2950 ml       Cervical Exam:  Dilation:  3  Effacement:  75%  Station: -3  Presentation: Vertex     Significant Labs:  Recent Lab Results         05/01/24  1758   05/01/24  1139        Magnesium  5.6   5.9               Physical Exam:   Constitutional: She is oriented to person, place, and time. She appears well-developed and well-nourished.    HENT:   Head: Normocephalic and atraumatic.    Eyes: Pupils are equal, round, and reactive to light. EOM are normal.     Cardiovascular:  Normal rate, regular rhythm and normal heart sounds.             Pulmonary/Chest: Effort normal and breath sounds normal. Right breast exhibits no inverted nipple, no mass, no nipple discharge, no skin change, no tenderness, no bleeding and no swelling. Left breast exhibits no inverted nipple, no mass, no nipple discharge, no skin change, no tenderness, no bleeding and no swelling. Breasts are symmetrical.        Abdominal: Soft. Bowel sounds are normal.     Genitourinary:    Vagina and uterus normal.             Musculoskeletal: Normal range of motion and moves all extremeties.       Neurological: She is alert and oriented to person, place, and time. She has normal reflexes.      Psychiatric: She has a normal mood and affect. Her behavior is normal. Judgment and thought content normal.       Review of Systems   Constitutional:  Negative for activity change, appetite change, fatigue and unexpected weight change.   HENT: Negative.     Respiratory:  Negative for cough, shortness of breath and wheezing.    Cardiovascular:  Negative for chest pain, palpitations and leg swelling.   Gastrointestinal:  Negative for abdominal pain, bloating, blood in stool, constipation, diarrhea, nausea, vomiting and reflux.   Genitourinary:  Negative for bladder incontinence, decreased libido, dysmenorrhea, dyspareunia, dysuria, flank pain, frequency, menorrhagia, menstrual problem, pelvic pain, urgency, vaginal bleeding, vaginal discharge, postcoital bleeding and vaginal odor.   Musculoskeletal:  Negative for back pain.   Integumentary:  Negative for rash, acne, hair changes, mole/lesion, breast mass, nipple discharge, breast skin changes and breast tenderness.   Neurological:  Negative for headaches.   Psychiatric/Behavioral:  Negative for depression and sleep disturbance. The patient is not nervous/anxious.    Breast: Negative for asymmetry, breast self exam, lump, mass, nipple discharge, skin changes and tenderness

## 2024-05-02 NOTE — TRANSFER OF CARE
"Anesthesia Transfer of Care Note    Patient: Alyssa Painter    Procedure(s) Performed: Procedure(s) (LRB):  CERCLAGE, CERVIX (N/A)    Patient location: PACU    Anesthesia Type: general    Transport from OR: Transported from OR on room air with adequate spontaneous ventilation    Post pain: adequate analgesia    Post assessment: no apparent anesthetic complications    Post vital signs: stable    Level of consciousness: sedated    Nausea/Vomiting: no nausea/vomiting    Complications: none    Transfer of care protocol was followed      Last vitals: Visit Vitals  BP (!) 104/51   Pulse 71   Temp 36.3 °C (97.4 °F) (Oral)   Resp 16   Ht 5' 2" (1.575 m)   Wt 114.1 kg (251 lb 9.6 oz)   SpO2 100%   Breastfeeding No   BMI 46.02 kg/m²     "

## 2024-05-02 NOTE — ANESTHESIA PROCEDURE NOTES
Spinal    Diagnosis: cervical incompetency  Patient location during procedure: OR  Start time: 5/2/2024 10:50 AM  Timeout: 5/2/2024 10:46 AM  End time: 5/2/2024 10:55 AM    Staffing  Authorizing Provider: Nic Paez DO  Performing Provider: Nic Paez DO    Staffing  Performed by: Nic Paez DO  Authorized by: Nic Paez DO    Preanesthetic Checklist  Completed: patient identified, IV checked, site marked, risks and benefits discussed, surgical consent, monitors and equipment checked, pre-op evaluation and timeout performed  Spinal Block  Patient position: sitting  Prep: ChloraPrep  Patient monitoring: cardiac monitor, continuous pulse ox and frequent blood pressure checks  Approach: midline  Location: L4-5  Injection technique: single shot  CSF Fluid: clear free-flowing CSF  Needle  Needle type: Quincke   Needle gauge: 22 G  Needle length: 3.5 in  Additional Documentation: incremental injection, negative aspiration for heme and no paresthesia on injection  Needle localization: anatomical landmarks  Assessment  Sensory level: T6   Dermatomal levels determined by alcohol wipe  Ease of block: moderate  Patient's tolerance of the procedure: comfortable throughout block  Medications:    Medications: BUPivacaine (pf) (MARCAINE) injection 0.75% - Intraspinal   1.4 mL - 5/2/2024 10:55:00 AM

## 2024-05-02 NOTE — ANESTHESIA PREPROCEDURE EVALUATION
"                                                                                                             05/02/2024  Alyssa Painter is a 29 y.o., female.      Pre-op Assessment    I have reviewed the Patient Summary Reports.     I have reviewed the Nursing Notes. I have reviewed the NPO Status.   I have reviewed the Medications.     Review of Systems  Anesthesia Hx:  No problems with previous Anesthesia             Denies Family Hx of Anesthesia complications.    Denies Personal Hx of Anesthesia complications.                    Social:  Non-Smoker, No Alcohol Use       Cardiovascular:  Exercise tolerance: good                                           OB/GYN/PEDS:  G1 at 28wk GA with cervical incompetency           Endocrine:        Morbid Obesity / BMI > 40      Physical Exam  General: Well nourished, Cooperative and Alert    Airway:  Mallampati: II   Mouth Opening: Normal  TM Distance: Normal  Tongue: Normal  Neck ROM: Normal ROM    Dental:  Intact    Chest/Lungs:  Clear to auscultation, Normal Respiratory Rate    Heart:  Rate: Normal  Rhythm: Regular Rhythm        Chemistry    No results found for: "NA", "K", "CL", "CO2", "BUN", "CREATININE", "GLU" No results found for: "CALCIUM", "ALKPHOS", "AST", "ALT", "BILITOT", "ESTGFRAFRICA", "EGFRNONAA"     Lab Results   Component Value Date    WBC 12.53 (H) 04/30/2024    HGB 11.1 (L) 04/30/2024    HCT 35.2 (L) 04/30/2024     04/30/2024     No results found for this or any previous visit.      Anesthesia Plan  Type of Anesthesia, risks & benefits discussed:    Anesthesia Type: Spinal  Intra-op Monitoring Plan: Standard ASA Monitors  Post Op Pain Control Plan: intrathecal opioid  Informed Consent: Informed consent signed with the Patient and all parties understand the risks and agree with anesthesia plan.  All questions answered. Patient consented to blood products? Yes  ASA Score: 3  Day of Surgery Review of History & Physical: H&P Update referred to the " surgeon/provider.I have interviewed and examined the patient. I have reviewed the patient's H&P dated: There are no significant changes.     Ready For Surgery From Anesthesia Perspective.     .

## 2024-05-02 NOTE — PLAN OF CARE
Patient discharged  Problem:  Fall Injury Risk  Goal: Absence of Fall, Infant Drop and Related Injury  Outcome: Met     Problem: Adult Inpatient Plan of Care  Goal: Plan of Care Review  Outcome: Met  Goal: Patient-Specific Goal (Individualized)  Outcome: Met  Goal: Absence of Hospital-Acquired Illness or Injury  Outcome: Met  Goal: Optimal Comfort and Wellbeing  Outcome: Met  Goal: Readiness for Transition of Care  Outcome: Met     Problem: Bariatric Environmental Safety  Goal: Safety Maintained with Care  Outcome: Met     Problem: Infection  Goal: Absence of Infection Signs and Symptoms  Outcome: Met     Problem:  Labor  Goal: Delayed  Delivery  Outcome: Met

## 2024-05-03 NOTE — PLAN OF CARE
Detail Level: Zone [facility] -  Labor and Delivery  Discharge Final Note    Primary Care Provider: No, Primary Doctor    Expected Discharge Date: 5/2/2024    Final Discharge Note (most recent)       Final Note - 05/03/24 0843          Final Note    Assessment Type Final Discharge Note     Anticipated Discharge Disposition Home or Self Care     What phone number can be called within the next 1-3 days to see how you are doing after discharge? [phone]        Post-Acute Status    Discharge Delays None known at this time                     Important Message from Medicare             Contact Info       Amol Romero MD   Specialty: Obstetrics and Gynecology    [facility] [address] [place] [address]  [facility] [address]   Phone: [phone]       Next Steps: Follow up in 1 week(s)          Pt to discharge home with no other needs at this time. [employer] info faxed to portal, but DC summary not available.      Detail Level: Detailed

## 2024-05-04 NOTE — PROCEDURES
OB ultrasound Note:    BPD- 29 week 2 day  HC-28 weeks 3 day  AC- 27 weeks 3 day  FL- 29 weeks 0 day    YUDY- 17.72 cm    Fetal heart rate:  149 bpm    Fetal position- vertex  Placental location- posterior    Impression-    KATHY July 18, 2024  Estimated gestational age 28 weeks 5 day  EFW 1220 g (2 lb 11 oz)  Pctl ( EFW) 46 th percentile      Biophysical Profile:    Fetal Movements- 2  Fetal breathing- 2  Fetal Tone- 2  Amniotic Fluid Volume- 2    Total - 8

## 2024-05-07 ENCOUNTER — PROCEDURE VISIT (OUTPATIENT)
Dept: OBSTETRICS AND GYNECOLOGY | Facility: CLINIC | Age: 29
End: 2024-05-07
Payer: COMMERCIAL

## 2024-05-07 ENCOUNTER — ROUTINE PRENATAL (OUTPATIENT)
Dept: OBSTETRICS AND GYNECOLOGY | Facility: CLINIC | Age: 29
End: 2024-05-07
Payer: COMMERCIAL

## 2024-05-07 VITALS
DIASTOLIC BLOOD PRESSURE: 73 MMHG | BODY MASS INDEX: 46.2 KG/M2 | SYSTOLIC BLOOD PRESSURE: 136 MMHG | WEIGHT: 252.63 LBS | HEART RATE: 137 BPM

## 2024-05-07 DIAGNOSIS — Z36.9 ANTENATAL SCREENING ENCOUNTER: Primary | ICD-10-CM

## 2024-05-07 DIAGNOSIS — O34.32 CERVICAL INCOMPETENCE DURING PREGNANCY IN SECOND TRIMESTER: ICD-10-CM

## 2024-05-07 DIAGNOSIS — Z3A.28 28 WEEKS GESTATION OF PREGNANCY: Primary | ICD-10-CM

## 2024-05-07 DIAGNOSIS — O34.32 PREMATURE CERVICAL DILATION IN SECOND TRIMESTER: ICD-10-CM

## 2024-05-07 DIAGNOSIS — Z36.89 ENCOUNTER FOR OTHER SPECIFIED ANTENATAL SCREENING: ICD-10-CM

## 2024-05-07 DIAGNOSIS — Z3A.28 28 WEEKS GESTATION OF PREGNANCY: ICD-10-CM

## 2024-05-07 DIAGNOSIS — O21.9 NAUSEA AND VOMITING IN PREGNANCY: ICD-10-CM

## 2024-05-07 DIAGNOSIS — O34.32 CERVICAL CERCLAGE SUTURE PRESENT IN SECOND TRIMESTER: ICD-10-CM

## 2024-05-07 LAB
BASOPHILS # BLD AUTO: 0.07 K/UL (ref 0–0.2)
BASOPHILS NFR BLD AUTO: 0.4 % (ref 0–1)
BILIRUB SERPL-MCNC: NORMAL MG/DL
BLOOD, POC UA: NORMAL
DIFFERENTIAL METHOD BLD: ABNORMAL
EOSINOPHIL # BLD AUTO: 0.06 K/UL (ref 0–0.5)
EOSINOPHIL NFR BLD AUTO: 0.4 % (ref 1–4)
ERYTHROCYTE [DISTWIDTH] IN BLOOD BY AUTOMATED COUNT: 13 % (ref 11.5–14.5)
GLUCOSE SERPL-MCNC: 116 MG/DL (ref 74–106)
GLUCOSE UR QL STRIP: NORMAL
HCT VFR BLD AUTO: 37.3 % (ref 38–47)
HGB BLD-MCNC: 11.8 G/DL (ref 12–16)
IMM GRANULOCYTES # BLD AUTO: 0.57 K/UL (ref 0–0.04)
IMM GRANULOCYTES NFR BLD: 3.5 % (ref 0–0.4)
KETONES UR QL STRIP: NORMAL
LEUKOCYTE ESTERASE URINE, POC: NORMAL
LYMPHOCYTES # BLD AUTO: 1.44 K/UL (ref 1–4.8)
LYMPHOCYTES NFR BLD AUTO: 8.9 % (ref 27–41)
LYMPHOCYTES NFR BLD MANUAL: 8 % (ref 27–41)
MCH RBC QN AUTO: 28.9 PG (ref 27–31)
MCHC RBC AUTO-ENTMCNC: 31.6 G/DL (ref 32–36)
MCV RBC AUTO: 91.2 FL (ref 80–96)
METAMYELOCYTES NFR BLD MANUAL: 1 %
MONOCYTES # BLD AUTO: 0.78 K/UL (ref 0–0.8)
MONOCYTES NFR BLD AUTO: 4.8 % (ref 2–6)
MONOCYTES NFR BLD MANUAL: 5 % (ref 2–6)
MPC BLD CALC-MCNC: 12 FL (ref 9.4–12.4)
NEUTROPHILS # BLD AUTO: 13.29 K/UL (ref 1.8–7.7)
NEUTROPHILS NFR BLD AUTO: 82 % (ref 53–65)
NEUTS SEG NFR BLD MANUAL: 86 % (ref 50–62)
NITRITE, POC UA: NORMAL
NRBC # BLD AUTO: 0 X10E3/UL
NRBC, AUTO (.00): 0 %
PH, POC UA: 6
PLATELET # BLD AUTO: 245 K/UL (ref 150–400)
PLATELET MORPHOLOGY: NORMAL
PROTEIN, POC: 30
RBC # BLD AUTO: 4.09 M/UL (ref 4.2–5.4)
RBC MORPH BLD: NORMAL
SPECIFIC GRAVITY, POC UA: >=1.03
UROBILINOGEN, POC UA: 0.2
WBC # BLD AUTO: 16.21 K/UL (ref 4.5–11)

## 2024-05-07 PROCEDURE — 36415 COLL VENOUS BLD VENIPUNCTURE: CPT | Mod: ,,, | Performed by: OBSTETRICS & GYNECOLOGY

## 2024-05-07 PROCEDURE — G0481 DRUG TEST DEF 8-14 CLASSES: HCPCS | Mod: ,,, | Performed by: CLINICAL MEDICAL LABORATORY

## 2024-05-07 PROCEDURE — 99499 UNLISTED E&M SERVICE: CPT | Mod: ,,, | Performed by: OBSTETRICS & GYNECOLOGY

## 2024-05-07 PROCEDURE — 76816 OB US FOLLOW-UP PER FETUS: CPT | Mod: ,,, | Performed by: OBSTETRICS & GYNECOLOGY

## 2024-05-07 PROCEDURE — 99214 OFFICE O/P EST MOD 30 MIN: CPT | Mod: ,,, | Performed by: OBSTETRICS & GYNECOLOGY

## 2024-05-07 PROCEDURE — 85025 COMPLETE CBC W/AUTO DIFF WBC: CPT | Mod: ,,, | Performed by: CLINICAL MEDICAL LABORATORY

## 2024-05-07 PROCEDURE — 82950 GLUCOSE TEST: CPT | Mod: ,,, | Performed by: CLINICAL MEDICAL LABORATORY

## 2024-05-07 RX ORDER — ONDANSETRON 4 MG/1
4 TABLET, FILM COATED ORAL EVERY 6 HOURS PRN
Qty: 90 TABLET | Refills: 3 | Status: SHIPPED | OUTPATIENT
Start: 2024-05-07

## 2024-05-07 NOTE — PROGRESS NOTES
29 y.o. female  at 28w4d   Reports fetal movement or fluttering. Denies any vaginal bleeding, leakage of fluid, cramping, contractions, or pressure.   She complains of nausea with vomiting and headaches.  Pt states she is doing well without any concerns.   Patient is not able to work but not bond to just bedrest.    Vitals  BP: 136/73  Pulse: (!) 137  Weight: 114.6 kg (252 lb 9.6 oz)  Prenatal  Fundal Height (cm): 28 cm  Fetal Heart Rate: 150s  Movement: Present  Urine Albumin/Glucose  Urine Albumin: 1+  Urine Glucose: Negative  Edema  LLE Edema: None  RLE Edema: None  Facial: None  Additional Edema?: No    Prenatal Labs:  Lab Results   Component Value Date    GROUPTRH O POS 2024    HGB 11.1 (L) 2024    HCT 35.2 (L) 2024     2024    RPR Non-Reactive 2024    LABNGO Negative 2024       The following were addressed during this visit:    1-8 Weeks  - Lifestyle Discussion   - Warning Signs   - Course of Care   - Physiology of Pregnancy   - Nutrition and Supplements   - Domestic Abuse Screen   - HIV Counseling   - Smoking Intervention   - SPAAD/Insurance Verification   - Importance of Exclusive Breastfeeding for First 6 Months   - Continuation of Breastfeeding of Complimentary after intro of solid foods   - Benefits of Breastfeeding     8-12 Weeks  - Review lab tests   - Genetic Counseling (NT/CVS/Amino)   - Influenza IM (for due date  - 3/31)   - Non-pharmacologic Pain Relief Methods for Labor & Birth     13-16 Weeks  - Quad screen   - Anatomy Ultrasound   - Breastfeeding Concerns & Resources   - Importance of Early Skin to Skin Contact     17-20 Weeks  - Quickening   - Lifestyle   - Ultrasound   - Importance of Early and Frequent Breastfeeding   - Baby-led Feeding   - Frequent feeding to help assure optimal milk production     21-24 Weeks  -  Labor Signs   - Travel During Pregnancy   - Gestational diabetes screening protocol   - Effective Position and Latch   -  Risks of Formula Use   - Risks of pacifier use     25-28 Weeks  -  Labor Signs   - Childbirth Education   - Maternity Leave paperwork   - Smoking Intervention   - Weight Gain/Diet/Exercise   - Rhogam Given   - Rooming in baby during your hospital stay       Daily fetal kick counts, bleeding, and  labor/labor precautions discussed.  Questions answered to desired level of satisfaction  Verbalized understanding to all information and instructions provided.    Total weight gain/weight loss in pregnancy: Not found.     Follow up in about 2 weeks (around 2024) for MIA.    A: 28w4d     ICD-10-CM ICD-9-CM    1.  screening encounter  Z36.9 V28.9 Drug Screen Definitive 14, Urine      Glucose, 1Hr Post Prandial      CBC Auto Differential      POCT Urinalysis      Drug Screen Definitive 14, Urine      US OB Limited with Biophysical Profile (xpd)      Glucose, 1Hr Post Prandial      CBC Auto Differential      2. 28 weeks gestation of pregnancy  Z3A.28 V22.2 Drug Screen Definitive 14, Urine      Glucose, 1Hr Post Prandial      CBC Auto Differential      POCT Urinalysis      Drug Screen Definitive 14, Urine      US OB Limited with Biophysical Profile (xpd)      Glucose, 1Hr Post Prandial      CBC Auto Differential      3. Encounter for other specified  screening  Z36.89 V28.9 Drug Screen Definitive 14, Urine      Drug Screen Definitive 14, Urine      4. Premature cervical dilation in second trimester  O34.32 654.53 US OB Limited with Biophysical Profile (xpd)      5. Cervical incompetence during pregnancy in second trimester  O34.32 654.53 US OB Limited with Biophysical Profile (xpd)      6. Cervical cerclage suture present in second trimester  O34.32 654.53 US OB Limited with Biophysical Profile (xpd)      7. Nausea and vomiting in pregnancy  O21.9 643.90             Amol Romero M.D., FCOG    OB/GYN

## 2024-05-10 LAB
6-ACETYLMORPHINE, URINE (RUSH): NEGATIVE 10 NG/ML
7-AMINOCLONAZEPAM, URINE (RUSH): NEGATIVE 25 NG/ML
A-HYDROXYALPRAZOLAM, URINE (RUSH): NEGATIVE 25 NG/ML
AMPHET UR QL SCN: NEGATIVE
BENZOYLECGONINE, URINE (RUSH): NEGATIVE 100 NG/ML
BUPRENORPHINE UR QL SCN: NEGATIVE 25 NG/ML
CODEINE, URINE (RUSH): NEGATIVE 25 NG/ML
CREAT UR-MCNC: 180 MG/DL (ref 28–219)
EDDP, URINE (RUSH): NEGATIVE 25 NG/ML
FENTANYL, URINE (RUSH): NEGATIVE 2.5 NG/ML
HYDROCODONE, URINE (RUSH): NEGATIVE 25 NG/ML
HYDROMORPHONE, URINE (RUSH): NEGATIVE 25 NG/ML
METHADONE UR QL SCN: NEGATIVE 25 NG/ML
METHAMPHET UR QL SCN: NEGATIVE
MORPHINE, URINE (RUSH): NEGATIVE 25 NG/ML
NORBUPRENORPHINE, URINE (RUSH): NEGATIVE 25 NG/ML
NORDIAZEPAM, URINE (RUSH): NEGATIVE 25 NG/ML
NORFENTANYL OXALATE, URINE (RUSH): NEGATIVE 5 NG/ML
NORHYDROCODONE, URINE (RUSH): NEGATIVE 50 NG/ML
NOROXYCODONE HCL, URINE (RUSH): NEGATIVE 50 NG/ML
OXYCODONE UR QL SCN: NEGATIVE 25 NG/ML
OXYMORPHONE, URINE (RUSH): NEGATIVE 25 NG/ML
PH UR STRIP: 6 PH UNITS
SP GR UR STRIP: 1.02
TAPENTADOL, URINE (RUSH): NEGATIVE 25 NG/ML
TEMAZEPAM, URINE (RUSH): NEGATIVE 25 NG/ML
THC-COOH, URINE (RUSH): NEGATIVE 25 NG/ML
TRAMADOL, URINE (RUSH): NEGATIVE 100 NG/ML

## 2024-05-20 ENCOUNTER — ROUTINE PRENATAL (OUTPATIENT)
Dept: OBSTETRICS AND GYNECOLOGY | Facility: CLINIC | Age: 29
End: 2024-05-20
Payer: COMMERCIAL

## 2024-05-20 VITALS
DIASTOLIC BLOOD PRESSURE: 72 MMHG | BODY MASS INDEX: 47.23 KG/M2 | WEIGHT: 258.19 LBS | HEART RATE: 118 BPM | SYSTOLIC BLOOD PRESSURE: 138 MMHG

## 2024-05-20 DIAGNOSIS — Z36.9 ANTENATAL SCREENING ENCOUNTER: Primary | ICD-10-CM

## 2024-05-20 DIAGNOSIS — Z3A.30 30 WEEKS GESTATION OF PREGNANCY: ICD-10-CM

## 2024-05-20 DIAGNOSIS — O34.33 CERVICAL CERCLAGE SUTURE PRESENT IN THIRD TRIMESTER: ICD-10-CM

## 2024-05-20 LAB
BILIRUB SERPL-MCNC: NORMAL MG/DL
BLOOD, POC UA: NORMAL
GLUCOSE UR QL STRIP: NORMAL
KETONES UR QL STRIP: NORMAL
LEUKOCYTE ESTERASE URINE, POC: NORMAL
NITRITE, POC UA: NORMAL
PH, POC UA: 6.5
PROTEIN, POC: 30
SPECIFIC GRAVITY, POC UA: >=1.03
UROBILINOGEN, POC UA: 0.2

## 2024-05-20 PROCEDURE — 99213 OFFICE O/P EST LOW 20 MIN: CPT | Mod: ,,, | Performed by: OBSTETRICS & GYNECOLOGY

## 2024-05-20 NOTE — PROGRESS NOTES
29 y.o. female  at 30w3d   Reports fetal movement or fluttering. Denies any vaginal bleeding, leakage of fluid, cramping, contractions, or pressure.   She complains of nothing.  Pt states she is doing well without any concerns.     Vitals  BP: 138/72  Pulse: (!) 118  Weight: 117.1 kg (258 lb 3.2 oz)  Prenatal  Fundal Height (cm): 31 cm  Fetal Heart Rate: 140s  Movement: Present  Urine Albumin/Glucose  Urine Albumin: 1+  Urine Glucose: Negative  Edema  LLE Edema: None  RLE Edema: None  Facial: None  Additional Edema?: No    Prenatal Labs:  Lab Results   Component Value Date    GROUPTRH O POS 2024    HGB 11.8 (L) 2024    HCT 37.3 (L) 2024     2024    RPR Non-Reactive 2024    LABNGO Negative 2024       The following were addressed during this visit:    29-32 Weeks  - Tdap Given   - Contraception/Tubal Consent   - Pre-registration   - Circumsision plans   - Op note review/ consent   - Birth Plan   - Pediatrician   - Fetal Kick Counts/PIH/PTL precautions   - Preeclampsia Education   - Quiet time       Daily fetal kick counts, bleeding, and  labor/labor precautions discussed.  Questions answered to desired level of satisfaction  Verbalized understanding to all information and instructions provided.    Total weight gain/weight loss in pregnancy: Not found.     Follow up in about 2 weeks (around 6/3/2024) for MIA.    A: 30w3d     ICD-10-CM ICD-9-CM    1.  screening encounter  Z36.9 V28.9 POCT Urinalysis      US OB 14+ Weeks TransAbd, w/Biophysical Profile, w/o NST, Single Gestation (xpd)      2. 30 weeks gestation of pregnancy  Z3A.30 V22.2 POCT Urinalysis      US OB 14+ Weeks TransAbd, w/Biophysical Profile, w/o NST, Single Gestation (xpd)      3. Cervical cerclage suture present in third trimester  O34.33 654.53 US OB 14+ Weeks TransAbd, w/Biophysical Profile, w/o NST, Single Gestation (xpd)            Amol Romero M.D., FCOG    OB/GYN

## 2024-05-28 NOTE — DISCHARGE SUMMARY
[facility] -  Labor and Delivery  Obstetrics  Discharge Summary      Patient Name: Alyssa Painter  MRN: [id number]  Admission Date: 2024  Hospital Length of Stay: 2 days  Discharge Date and Time: 2024  4:52 PM  Attending Physician: Dorothy att. providers found   Discharging Provider: Amol Romero MD   Primary Care Provider: Dorothy, Primary Doctor    HPI:   Admit Diagnosis/Chief Complaint:  Premature cervical dilation  History           Chief Complaint   Patient presents with    Rule out PTL       Patient found to be dilated 2-3cm at clinic visit today. Denies contractions, abdominal pain            Pt of Dr Coleman ([facility])     30yo  at 27w4d sent from office secondary to CL 2.23 with funneling. Cervical exam was 2-3/50/-3.     Patient denies any vaginal bleeding, leakage of fluid or rhythmic contractions  Reports fetal movement     Denies any recent abdominal trauma  Denies abdominal /pelvic pain or cramping  Denies fever, chills, nausea, vomiting  Denies suprapubic or flank pain  Denies any abnormal vaginal discharge or odor  Denies urgency/frequency  Denies STIs     DOES report recent sexual intercourse                        Obstetric HPI:  Patient reports None contractions, active fetal movement, absent vaginal bleeding , absent loss of fluid      Objective:      Vital Signs (Most Recent):  Temp: 98.3 °F (36.8 °C) (24 1457)  Pulse: 104 (24 1457)  Resp: 20 (24 1457)  BP: 117/63 (24 1457)  SpO2: 100 % (24 1457) Vital Signs (24h Range):  Temp:  [98.3 °F (36.8 °C)] 98.3 °F (36.8 °C)  Pulse:  [] 104  Resp:  [20] 20  SpO2:  [100 %] 100 %  BP: (117-124)/(63-79) 117/63      Weight: 114.1 kg (251 lb 9.6 oz)  Body mass index is 46.02 kg/m².     FHT: 140 Cat 1 (reassuring)  TOCO:  Q no minutes     No intake or output data in the 24 hours ending 24 1608     Cervical Exam: (from office report and discussion with Dr Coleman)  Dilation:            2  Effacement:             50%  Station:            -3  Presentation:            Vertex      Significant Labs:  Recent Lab Results           04/30/24  1322         Bilirubin, POC neg          Spec Grav UA 1.015          Blood, UA trace          pH, UA 6.0          Protein, POC trace          Urobilinogen, UA 0.2          Nitrite, UA neg          Glucose, UA neg          Ketones, UA neg          WBC, UA neg                        Review of patient's allergies indicates:   Allergen Reactions    Penicillin         Pt states she has been allergic since childhood.      No past medical history on file.  No past surgical history on file.  No family history on file.  Social History   Social History            Tobacco Use    Smoking status: Never       Passive exposure: Never    Smokeless tobacco: Never   Substance Use Topics    Alcohol use: Never    Drug use: Never         Review of Systems   Constitutional:  Negative for chills and fever.   HENT:  Negative for congestion, dental problem, rhinorrhea, sinus pressure, sinus pain and sore throat.    Eyes:  Negative for photophobia and visual disturbance.   Respiratory:  Negative for chest tightness and shortness of breath.    Cardiovascular:  Negative for chest pain and palpitations.   Gastrointestinal:  Negative for abdominal pain, blood in stool, constipation, diarrhea, nausea and vomiting.   Genitourinary:  Negative for dysuria, genital sores, hematuria, pelvic pain, urgency, vaginal bleeding and vaginal discharge.   Musculoskeletal:  Negative for back pain.   Skin:  Negative for rash.   Neurological:  Negative for dizziness, seizures, syncope, weakness, light-headedness and headaches.   Hematological:  Does not bruise/bleed easily.         Physical Exam             Initial Vitals [04/30/24 1457]   BP Pulse Resp Temp SpO2   117/63 104 20 98.3 °F (36.8 °C) 100 %       MAP           --              Physical Exam     Constitutional: She appears well-developed and well-nourished. No distress.    HENT:   Head: Normocephalic and atraumatic.   Eyes: Pupils are equal, round, and reactive to light. No scleral icterus.   Neck: Neck supple.   Normal range of motion.  Cardiovascular:  Normal rate, regular rhythm and normal heart sounds.           Pulmonary/Chest: Breath sounds normal. No respiratory distress. She exhibits no tenderness.   Abdominal: Abdomen is soft. She exhibits no distension. There is no abdominal tenderness.   Soft gravid no uterine tenderness There is no rebound and no guarding.   Genitourinary:    Vagina normal.      No vaginal discharge.      Musculoskeletal:         General: Normal range of motion.      Cervical back: Normal range of motion and neck supple.      Neurological: She is alert and oriented to person, place, and time. She has normal reflexes. She displays normal reflexes.            ED Course   Labs Reviewed - No data to display     Imaging Results    None            Medical Decision Making     Premature cervical dilation in office    Admit    Steroids for fetal lung maturity    Magnesium for neuro-protection / tocolysis           Amount and/or Complexity of Data Reviewed  Labs: ordered.        Medications - No data to display            Plan                Clinical Impression:   Final diagnoses:  [Z3A.27] 27 weeks gestation of pregnancy - noted  [O34.32] Premature cervical dilation in second trimester - Admit   Steroids for fetal lung maturity   Magnesium for neuro-protection / tocolysis (Primary)    FHT: 140sCat 1 (reassuring)  TOCO:  Q 0 minutes    Procedure(s) (LRB):  CERCLAGE, CERVIX (N/A)     Hospital Course:   This is a 29-year-old prima  admitted at 27 weeks gestation secondary to cervical shortening and dilation consistent with cervical incompetence.  Pt has received 2 doses of Betamethasone and 24 hours of MgSO4 for neuro-protection. Pt desires cerclage that was placed on 2024.  Her procedure occurred without complication or incident.  Patient was therefore  discharged to home following cerclage placement and observation in stable condition.  Disposition was to home.  Patient was placed on pelvic rest and instructed follow up with Dr. Coleman in 1 week.         Final Active Diagnoses:    Diagnosis Date Noted POA    PRINCIPAL PROBLEM:  Cervical cerclage suture present in second trimester [O34.32] 05/02/2024 Unknown    27 weeks gestation of pregnancy [Z3A.27] 04/30/2024 Not Applicable    Cervical incompetence during pregnancy in second trimester [O34.32] 04/30/2024 Unknown      Problems Resolved During this Admission:        Significant Diagnostic Studies: N/A      Feeding Method: n/a    Immunizations       None            This patient has no babies on file.  Pending Diagnostic Studies:       Procedure Component Value Units Date/Time    EXTRA TUBES [[id number]] Collected: 05/01/24 0548    Order Status: Sent Lab Status: In process Updated: 05/01/24 0558    Specimen: Blood, Venous     Narrative:      The following orders were created for panel order EXTRA TUBES.  Procedure                               Abnormality         Status                     ---------                               -----------         ------                     Lavender Top Hold[[id number]]                               In process                   Please view results for these tests on the individual orders.            Discharged Condition: good    Disposition: Home or Self Care    Follow Up:   Follow-up Information       Amol Romero MD Follow up in 1 week(s).    Specialty: Obstetrics and Gynecology  Contact information:  [address] [place] [address]  [facility] [address]  [phone]                           Patient Instructions:      Diet general     Pelvic Rest     Lifting restrictions     Call MD for:  extreme fatigue     Call MD for:  persistent dizziness or light-headedness     Call MD for:  hives     Call MD for:  redness, tenderness, or signs of infection (pain, swelling,  redness, odor or green/yellow discharge around incision site)     Call MD for:  difficulty breathing, headache or visual disturbances     Call MD for:  severe uncontrolled pain     Call MD for:  persistent nausea and vomiting     Call MD for:  temperature >100.4     Activity as tolerated     Medications:  Discharge Medication List as of 5/3/2024  8:40 AM        CONTINUE these medications which have NOT CHANGED    Details   prenatal no115/iron/folic acid (PRENATAL 19 ORAL) Take by mouth., Historical Med             Amol Romero MD  Obstetrics  [facility] -  Labor and Delivery

## 2024-05-30 ENCOUNTER — HOSPITAL ENCOUNTER (INPATIENT)
Facility: HOSPITAL | Age: 29
LOS: 4 days | Discharge: HOME | End: 2024-06-03
Attending: OBSTETRICS & GYNECOLOGY | Admitting: OBSTETRICS & GYNECOLOGY
Payer: COMMERCIAL

## 2024-05-30 DIAGNOSIS — O42.10 PROLONGED PREMATURE RUPTURE OF MEMBRANES: ICD-10-CM

## 2024-05-30 DIAGNOSIS — O60.03 PRETERM LABOR IN THIRD TRIMESTER WITHOUT DELIVERY: ICD-10-CM

## 2024-05-30 DIAGNOSIS — Z3A.32 32 WEEKS GESTATION OF PREGNANCY: ICD-10-CM

## 2024-05-30 DIAGNOSIS — Z3A.31 31 WEEKS GESTATION OF PREGNANCY: ICD-10-CM

## 2024-05-30 DIAGNOSIS — O41.1290: ICD-10-CM

## 2024-05-30 DIAGNOSIS — B96.89: ICD-10-CM

## 2024-05-30 LAB
BASOPHILS # BLD AUTO: 0.04 K/UL (ref 0–0.2)
BASOPHILS NFR BLD AUTO: 0.2 % (ref 0–1)
DIFFERENTIAL METHOD BLD: ABNORMAL
EOSINOPHIL # BLD AUTO: 0.02 K/UL (ref 0–0.5)
EOSINOPHIL NFR BLD AUTO: 0.1 % (ref 1–4)
ERYTHROCYTE [DISTWIDTH] IN BLOOD BY AUTOMATED COUNT: 13.7 % (ref 11.5–14.5)
HBV SURFACE AG SERPL QL IA: NORMAL
HCT VFR BLD AUTO: 34.2 % (ref 38–47)
HGB BLD-MCNC: 10.9 G/DL (ref 12–16)
IMM GRANULOCYTES # BLD AUTO: 0.18 K/UL (ref 0–0.04)
IMM GRANULOCYTES NFR BLD: 1 % (ref 0–0.4)
LYMPHOCYTES # BLD AUTO: 1.03 K/UL (ref 1–4.8)
LYMPHOCYTES NFR BLD AUTO: 5.6 % (ref 27–41)
MCH RBC QN AUTO: 28.5 PG (ref 27–31)
MCHC RBC AUTO-ENTMCNC: 31.9 G/DL (ref 32–36)
MCV RBC AUTO: 89.5 FL (ref 80–96)
MONOCYTES # BLD AUTO: 0.98 K/UL (ref 0–0.8)
MONOCYTES NFR BLD AUTO: 5.4 % (ref 2–6)
MPC BLD CALC-MCNC: 11.2 FL (ref 9.4–12.4)
NEUTROPHILS # BLD AUTO: 16.04 K/UL (ref 1.8–7.7)
NEUTROPHILS NFR BLD AUTO: 87.7 % (ref 53–65)
NRBC # BLD AUTO: 0 X10E3/UL
NRBC, AUTO (.00): 0 %
PLATELET # BLD AUTO: 220 K/UL (ref 150–400)
RBC # BLD AUTO: 3.82 M/UL (ref 4.2–5.4)
WBC # BLD AUTO: 18.29 K/UL (ref 4.5–11)

## 2024-05-30 PROCEDURE — 87076 CULTURE ANAEROBE IDENT EACH: CPT | Performed by: OBSTETRICS & GYNECOLOGY

## 2024-05-30 PROCEDURE — 86901 BLOOD TYPING SEROLOGIC RH(D): CPT | Performed by: OBSTETRICS & GYNECOLOGY

## 2024-05-30 PROCEDURE — 36415 COLL VENOUS BLD VENIPUNCTURE: CPT | Performed by: OBSTETRICS & GYNECOLOGY

## 2024-05-30 PROCEDURE — 87070 CULTURE OTHR SPECIMN AEROBIC: CPT | Performed by: OBSTETRICS & GYNECOLOGY

## 2024-05-30 PROCEDURE — 11000001 HC ACUTE MED/SURG PRIVATE ROOM

## 2024-05-30 PROCEDURE — 81003 URINALYSIS AUTO W/O SCOPE: CPT | Performed by: OBSTETRICS & GYNECOLOGY

## 2024-05-30 PROCEDURE — 87086 URINE CULTURE/COLONY COUNT: CPT | Performed by: OBSTETRICS & GYNECOLOGY

## 2024-05-30 PROCEDURE — 86900 BLOOD TYPING SEROLOGIC ABO: CPT | Performed by: OBSTETRICS & GYNECOLOGY

## 2024-05-30 PROCEDURE — 87389 HIV-1 AG W/HIV-1&-2 AB AG IA: CPT | Performed by: OBSTETRICS & GYNECOLOGY

## 2024-05-30 PROCEDURE — 85025 COMPLETE CBC W/AUTO DIFF WBC: CPT | Performed by: OBSTETRICS & GYNECOLOGY

## 2024-05-30 PROCEDURE — 87340 HEPATITIS B SURFACE AG IA: CPT | Performed by: OBSTETRICS & GYNECOLOGY

## 2024-05-30 PROCEDURE — 63600175 PHARM REV CODE 636 W HCPCS: Mod: JZ,JG | Performed by: OBSTETRICS & GYNECOLOGY

## 2024-05-30 PROCEDURE — 99285 EMERGENCY DEPT VISIT HI MDM: CPT | Mod: 25

## 2024-05-30 PROCEDURE — 99223 1ST HOSP IP/OBS HIGH 75: CPT | Mod: ,,, | Performed by: OBSTETRICS & GYNECOLOGY

## 2024-05-30 PROCEDURE — 0UCC7ZZ EXTIRPATION OF MATTER FROM CERVIX, VIA NATURAL OR ARTIFICIAL OPENING: ICD-10-PCS | Performed by: OBSTETRICS & GYNECOLOGY

## 2024-05-30 RX ORDER — CLINDAMYCIN PHOSPHATE 900 MG/50ML
900 INJECTION, SOLUTION INTRAVENOUS
Status: COMPLETED | OUTPATIENT
Start: 2024-05-30 | End: 2024-06-01

## 2024-05-30 RX ORDER — SODIUM CHLORIDE 0.9 % (FLUSH) 0.9 %
10 SYRINGE (ML) INJECTION
Status: DISCONTINUED | OUTPATIENT
Start: 2024-05-30 | End: 2024-06-03 | Stop reason: HOSPADM

## 2024-05-30 RX ORDER — MORPHINE SULFATE 4 MG/ML
4 INJECTION, SOLUTION INTRAMUSCULAR; INTRAVENOUS EVERY 4 HOURS PRN
Status: DISCONTINUED | OUTPATIENT
Start: 2024-05-31 | End: 2024-06-03 | Stop reason: HOSPADM

## 2024-05-30 RX ORDER — MORPHINE SULFATE 2 MG/ML
2 INJECTION, SOLUTION INTRAMUSCULAR; INTRAVENOUS
Status: DISCONTINUED | OUTPATIENT
Start: 2024-05-31 | End: 2024-06-03 | Stop reason: HOSPADM

## 2024-05-30 RX ADMIN — CLINDAMYCIN PHOSPHATE 900 MG: 900 INJECTION, SOLUTION INTRAVENOUS at 10:05

## 2024-05-31 ENCOUNTER — ANESTHESIA EVENT (OUTPATIENT)
Dept: OBSTETRICS AND GYNECOLOGY | Facility: HOSPITAL | Age: 29
End: 2024-05-31
Payer: COMMERCIAL

## 2024-05-31 ENCOUNTER — ANESTHESIA (OUTPATIENT)
Dept: OBSTETRICS AND GYNECOLOGY | Facility: HOSPITAL | Age: 29
End: 2024-05-31
Payer: COMMERCIAL

## 2024-05-31 LAB
ALBUMIN SERPL BCP-MCNC: 2.1 G/DL (ref 3.5–5)
ALBUMIN/GLOB SERPL: 0.5 {RATIO}
ALP SERPL-CCNC: 102 U/L (ref 37–98)
ALT SERPL W P-5'-P-CCNC: 11 U/L (ref 13–56)
ANION GAP SERPL CALCULATED.3IONS-SCNC: 10 MMOL/L (ref 7–16)
AST SERPL W P-5'-P-CCNC: 14 U/L (ref 15–37)
BACTERIA #/AREA URNS HPF: ABNORMAL /HPF
BASOPHILS # BLD AUTO: 0.03 K/UL (ref 0–0.2)
BASOPHILS NFR BLD AUTO: 0.2 % (ref 0–1)
BILIRUB SERPL-MCNC: 0.6 MG/DL (ref ?–1.2)
BILIRUB UR QL STRIP: NEGATIVE
BUN SERPL-MCNC: 9 MG/DL (ref 7–18)
BUN/CREAT SERPL: 15 (ref 6–20)
CALCIUM SERPL-MCNC: 8.6 MG/DL (ref 8.5–10.1)
CAOX CRY UR QL COMP ASSIST: ABNORMAL
CHLORIDE SERPL-SCNC: 109 MMOL/L (ref 98–107)
CLARITY UR: ABNORMAL
CO2 SERPL-SCNC: 21 MMOL/L (ref 21–32)
COLOR UR: YELLOW
CREAT SERPL-MCNC: 0.59 MG/DL (ref 0.55–1.02)
DIFFERENTIAL METHOD BLD: ABNORMAL
EGFR (NO RACE VARIABLE) (RUSH/TITUS): 126 ML/MIN/1.73M2
EOSINOPHIL # BLD AUTO: 0.01 K/UL (ref 0–0.5)
EOSINOPHIL NFR BLD AUTO: 0.1 % (ref 1–4)
ERYTHROCYTE [DISTWIDTH] IN BLOOD BY AUTOMATED COUNT: 13.4 % (ref 11.5–14.5)
GLOBULIN SER-MCNC: 3.9 G/DL (ref 2–4)
GLUCOSE SERPL-MCNC: 85 MG/DL (ref 74–106)
GLUCOSE UR STRIP-MCNC: NORMAL MG/DL
HCO3 UR-SCNC: 20.2 MMOL/L (ref 24–28)
HCO3 UR-SCNC: 23.5 MMOL/L (ref 18–23)
HCT VFR BLD AUTO: 29.9 % (ref 38–47)
HGB BLD-MCNC: 9.5 G/DL (ref 12–16)
HIV 1+O+2 AB SERPL QL: NORMAL
IMM GRANULOCYTES # BLD AUTO: 0.16 K/UL (ref 0–0.04)
IMM GRANULOCYTES NFR BLD: 0.9 % (ref 0–0.4)
INDIRECT COOMBS: NORMAL
KETONES UR STRIP-SCNC: 150 MG/DL
LACTATE SERPL-SCNC: 1.5 MMOL/L (ref 0.4–2)
LEUKOCYTE ESTERASE UR QL STRIP: ABNORMAL
LYMPHOCYTES # BLD AUTO: 0.91 K/UL (ref 1–4.8)
LYMPHOCYTES NFR BLD AUTO: 5.2 % (ref 27–41)
MCH RBC QN AUTO: 28.4 PG (ref 27–31)
MCHC RBC AUTO-ENTMCNC: 31.8 G/DL (ref 32–36)
MCV RBC AUTO: 89.5 FL (ref 80–96)
MONOCYTES # BLD AUTO: 0.94 K/UL (ref 0–0.8)
MONOCYTES NFR BLD AUTO: 5.4 % (ref 2–6)
MPC BLD CALC-MCNC: 11.7 FL (ref 9.4–12.4)
MUCOUS, UA: ABNORMAL /LPF
NEUTROPHILS # BLD AUTO: 15.32 K/UL (ref 1.8–7.7)
NEUTROPHILS NFR BLD AUTO: 88.2 % (ref 53–65)
NITRITE UR QL STRIP: NEGATIVE
NRBC # BLD AUTO: 0 X10E3/UL
NRBC, AUTO (.00): 0 %
PCO2 BLDA: 41 MMHG
PCO2 BLDA: 56 MMHG (ref 35–48)
PH SMN: 7.23 [PH] (ref 7.35–7.45)
PH SMN: 7.3 [PH] (ref 7.32–7.42)
PH UR STRIP: 6 PH UNITS
PLATELET # BLD AUTO: 187 K/UL (ref 150–400)
PO2 BLDA: 15 MMHG
PO2 BLDA: 23 MMHG (ref 25–40)
POC A-ADO2: ABNORMAL MMHG
POC BASE EXCESS: -4.8 MMOL/L (ref -2–3)
POC BASE EXCESS: -5.9 MMOL/L (ref -2–2)
POC SATURATED O2: 25.4 % (ref 95–98)
POC SATURATED O2: 58.9 %
POTASSIUM SERPL-SCNC: 4 MMOL/L (ref 3.5–5.1)
PROT SERPL-MCNC: 6 G/DL (ref 6.4–8.2)
PROT UR QL STRIP: 50
RBC # BLD AUTO: 3.34 M/UL (ref 4.2–5.4)
RBC # UR STRIP: ABNORMAL /UL
RBC #/AREA URNS HPF: 172 /HPF
RH BLD: NORMAL
SODIUM SERPL-SCNC: 136 MMOL/L (ref 136–145)
SP GR UR STRIP: 1.03
SPECIMEN OUTDATE: NORMAL
SQUAMOUS #/AREA URNS LPF: ABNORMAL /HPF
UROBILINOGEN UR STRIP-ACNC: NORMAL MG/DL
WBC # BLD AUTO: 17.37 K/UL (ref 4.5–11)
WBC #/AREA URNS HPF: >182 /HPF

## 2024-05-31 PROCEDURE — 59409 OBSTETRICAL CARE: CPT | Mod: AA,P3,, | Performed by: ANESTHESIOLOGY

## 2024-05-31 PROCEDURE — 25000003 PHARM REV CODE 250: Performed by: ANESTHESIOLOGY

## 2024-05-31 PROCEDURE — 63600175 PHARM REV CODE 636 W HCPCS: Performed by: OBSTETRICS & GYNECOLOGY

## 2024-05-31 PROCEDURE — 36415 COLL VENOUS BLD VENIPUNCTURE: CPT | Performed by: OBSTETRICS & GYNECOLOGY

## 2024-05-31 PROCEDURE — 11000001 HC ACUTE MED/SURG PRIVATE ROOM

## 2024-05-31 PROCEDURE — 25000003 PHARM REV CODE 250: Performed by: OBSTETRICS & GYNECOLOGY

## 2024-05-31 PROCEDURE — C1751 CATH, INF, PER/CENT/MIDLINE: HCPCS | Performed by: ANESTHESIOLOGY

## 2024-05-31 PROCEDURE — 87070 CULTURE OTHR SPECIMN AEROBIC: CPT | Performed by: OBSTETRICS & GYNECOLOGY

## 2024-05-31 PROCEDURE — 83605 ASSAY OF LACTIC ACID: CPT | Performed by: OBSTETRICS & GYNECOLOGY

## 2024-05-31 PROCEDURE — 87076 CULTURE ANAEROBE IDENT EACH: CPT | Performed by: OBSTETRICS & GYNECOLOGY

## 2024-05-31 PROCEDURE — 88307 TISSUE EXAM BY PATHOLOGIST: CPT | Mod: 26,,, | Performed by: PATHOLOGY

## 2024-05-31 PROCEDURE — 59409 OBSTETRICAL CARE: CPT | Mod: SC,,, | Performed by: OBSTETRICS & GYNECOLOGY

## 2024-05-31 PROCEDURE — 72100002 HC LABOR CARE, 1ST 8 HOURS

## 2024-05-31 PROCEDURE — 51702 INSERT TEMP BLADDER CATH: CPT

## 2024-05-31 PROCEDURE — 82803 BLOOD GASES ANY COMBINATION: CPT

## 2024-05-31 PROCEDURE — 72100003 HC LABOR CARE, EA. ADDL. 8 HRS

## 2024-05-31 PROCEDURE — 62326 NJX INTERLAMINAR LMBR/SAC: CPT | Mod: AA | Performed by: ANESTHESIOLOGY

## 2024-05-31 PROCEDURE — 85025 COMPLETE CBC W/AUTO DIFF WBC: CPT | Performed by: OBSTETRICS & GYNECOLOGY

## 2024-05-31 PROCEDURE — 27000727 HC TRAY FOLEY W-METER 16-18FR

## 2024-05-31 PROCEDURE — 88307 TISSUE EXAM BY PATHOLOGIST: CPT | Mod: TC,SUR | Performed by: OBSTETRICS & GYNECOLOGY

## 2024-05-31 PROCEDURE — 80053 COMPREHEN METABOLIC PANEL: CPT | Performed by: OBSTETRICS & GYNECOLOGY

## 2024-05-31 PROCEDURE — 63600175 PHARM REV CODE 636 W HCPCS: Performed by: ANESTHESIOLOGY

## 2024-05-31 PROCEDURE — 72200005 HC VAGINAL DELIVERY LEVEL II

## 2024-05-31 RX ORDER — ONDANSETRON HYDROCHLORIDE 2 MG/ML
4 INJECTION, SOLUTION INTRAVENOUS EVERY 6 HOURS PRN
Status: DISCONTINUED | OUTPATIENT
Start: 2024-05-31 | End: 2024-06-03 | Stop reason: HOSPADM

## 2024-05-31 RX ORDER — OXYTOCIN/RINGER'S LACTATE 30/500 ML
95 PLASTIC BAG, INJECTION (ML) INTRAVENOUS ONCE
Status: DISCONTINUED | OUTPATIENT
Start: 2024-05-31 | End: 2024-06-03 | Stop reason: HOSPADM

## 2024-05-31 RX ORDER — CARBOPROST TROMETHAMINE 250 UG/ML
250 INJECTION, SOLUTION INTRAMUSCULAR ONCE AS NEEDED
Status: COMPLETED | OUTPATIENT
Start: 2024-05-31 | End: 2024-05-31

## 2024-05-31 RX ORDER — OXYCODONE HYDROCHLORIDE 5 MG/1
10 TABLET ORAL EVERY 4 HOURS PRN
OUTPATIENT
Start: 2024-05-31 | End: 2024-06-01

## 2024-05-31 RX ORDER — DIPHENOXYLATE HYDROCHLORIDE AND ATROPINE SULFATE 2.5; .025 MG/1; MG/1
2 TABLET ORAL EVERY 6 HOURS PRN
Status: DISCONTINUED | OUTPATIENT
Start: 2024-05-31 | End: 2024-06-03 | Stop reason: HOSPADM

## 2024-05-31 RX ORDER — IBUPROFEN 600 MG/1
600 TABLET ORAL EVERY 6 HOURS PRN
Status: DISCONTINUED | OUTPATIENT
Start: 2024-05-31 | End: 2024-06-03 | Stop reason: HOSPADM

## 2024-05-31 RX ORDER — HYDROCORTISONE 25 MG/G
CREAM TOPICAL 3 TIMES DAILY PRN
Status: DISCONTINUED | OUTPATIENT
Start: 2024-05-31 | End: 2024-06-03 | Stop reason: HOSPADM

## 2024-05-31 RX ORDER — ACETAMINOPHEN 325 MG/1
650 TABLET ORAL EVERY 6 HOURS SCHEDULED
Status: DISCONTINUED | OUTPATIENT
Start: 2024-05-31 | End: 2024-06-03 | Stop reason: HOSPADM

## 2024-05-31 RX ORDER — KETOROLAC TROMETHAMINE 30 MG/ML
30 INJECTION, SOLUTION INTRAMUSCULAR; INTRAVENOUS EVERY 6 HOURS
OUTPATIENT
Start: 2024-05-31 | End: 2024-06-01

## 2024-05-31 RX ORDER — SIMETHICONE 80 MG
1 TABLET,CHEWABLE ORAL EVERY 6 HOURS PRN
Status: DISCONTINUED | OUTPATIENT
Start: 2024-05-31 | End: 2024-06-03 | Stop reason: HOSPADM

## 2024-05-31 RX ORDER — OXYCODONE HYDROCHLORIDE 5 MG/1
5 TABLET ORAL EVERY 4 HOURS PRN
OUTPATIENT
Start: 2024-05-31 | End: 2024-06-01

## 2024-05-31 RX ORDER — ONDANSETRON 4 MG/1
8 TABLET, ORALLY DISINTEGRATING ORAL EVERY 8 HOURS PRN
Status: DISCONTINUED | OUTPATIENT
Start: 2024-05-31 | End: 2024-06-03 | Stop reason: HOSPADM

## 2024-05-31 RX ORDER — SODIUM CHLORIDE 0.9 % (FLUSH) 0.9 %
10 SYRINGE (ML) INJECTION
Status: DISCONTINUED | OUTPATIENT
Start: 2024-05-31 | End: 2024-06-03 | Stop reason: HOSPADM

## 2024-05-31 RX ORDER — OXYTOCIN/RINGER'S LACTATE 30/500 ML
334 PLASTIC BAG, INJECTION (ML) INTRAVENOUS ONCE AS NEEDED
Status: DISCONTINUED | OUTPATIENT
Start: 2024-05-31 | End: 2024-06-03 | Stop reason: HOSPADM

## 2024-05-31 RX ORDER — FENTANYL/ROPIVACAINE/NS/PF 2MCG/ML-.2
PLASTIC BAG, INJECTION (ML) INJECTION CONTINUOUS
Status: DISCONTINUED | OUTPATIENT
Start: 2024-05-31 | End: 2024-06-03 | Stop reason: HOSPADM

## 2024-05-31 RX ORDER — LIDOCAINE HYDROCHLORIDE 20 MG/ML
10 INJECTION, SOLUTION EPIDURAL; INFILTRATION; INTRACAUDAL; PERINEURAL EVERY 5 MIN PRN
Status: DISCONTINUED | OUTPATIENT
Start: 2024-05-31 | End: 2024-06-03 | Stop reason: HOSPADM

## 2024-05-31 RX ORDER — OXYTOCIN 10 [USP'U]/ML
10 INJECTION, SOLUTION INTRAMUSCULAR; INTRAVENOUS ONCE AS NEEDED
Status: DISCONTINUED | OUTPATIENT
Start: 2024-05-31 | End: 2024-06-03 | Stop reason: HOSPADM

## 2024-05-31 RX ORDER — PRENATAL WITH FERROUS FUM AND FOLIC ACID 3080; 920; 120; 400; 22; 1.84; 3; 20; 10; 1; 12; 200; 27; 25; 2 [IU]/1; [IU]/1; MG/1; [IU]/1; MG/1; MG/1; MG/1; MG/1; MG/1; MG/1; UG/1; MG/1; MG/1; MG/1; MG/1
1 TABLET ORAL DAILY
Status: DISCONTINUED | OUTPATIENT
Start: 2024-05-31 | End: 2024-06-03 | Stop reason: HOSPADM

## 2024-05-31 RX ORDER — EPHEDRINE SULFATE 50 MG/ML
10 INJECTION, SOLUTION INTRAVENOUS ONCE
Status: COMPLETED | OUTPATIENT
Start: 2024-05-31 | End: 2024-05-31

## 2024-05-31 RX ORDER — DIPHENHYDRAMINE HYDROCHLORIDE 50 MG/ML
25 INJECTION INTRAMUSCULAR; INTRAVENOUS EVERY 4 HOURS PRN
Status: DISCONTINUED | OUTPATIENT
Start: 2024-05-31 | End: 2024-06-03 | Stop reason: HOSPADM

## 2024-05-31 RX ORDER — SODIUM CHLORIDE, SODIUM LACTATE, POTASSIUM CHLORIDE, CALCIUM CHLORIDE 600; 310; 30; 20 MG/100ML; MG/100ML; MG/100ML; MG/100ML
INJECTION, SOLUTION INTRAVENOUS CONTINUOUS
Status: DISCONTINUED | OUTPATIENT
Start: 2024-05-31 | End: 2024-06-03 | Stop reason: HOSPADM

## 2024-05-31 RX ORDER — MISOPROSTOL 200 UG/1
800 TABLET ORAL ONCE AS NEEDED
Status: DISCONTINUED | OUTPATIENT
Start: 2024-05-31 | End: 2024-06-03 | Stop reason: HOSPADM

## 2024-05-31 RX ORDER — CARBOPROST TROMETHAMINE 250 UG/ML
250 INJECTION, SOLUTION INTRAMUSCULAR
Status: DISCONTINUED | OUTPATIENT
Start: 2024-05-31 | End: 2024-06-03 | Stop reason: HOSPADM

## 2024-05-31 RX ORDER — ACETAMINOPHEN 325 MG/1
650 TABLET ORAL EVERY 6 HOURS
OUTPATIENT
Start: 2024-05-31 | End: 2024-06-01

## 2024-05-31 RX ORDER — MISOPROSTOL 200 UG/1
800 TABLET ORAL ONCE AS NEEDED
Status: COMPLETED | OUTPATIENT
Start: 2024-05-31 | End: 2024-05-31

## 2024-05-31 RX ORDER — DIPHENHYDRAMINE HCL 25 MG
25 CAPSULE ORAL EVERY 4 HOURS PRN
Status: DISCONTINUED | OUTPATIENT
Start: 2024-05-31 | End: 2024-06-03 | Stop reason: HOSPADM

## 2024-05-31 RX ORDER — ONDANSETRON HYDROCHLORIDE 2 MG/ML
4 INJECTION, SOLUTION INTRAVENOUS EVERY 6 HOURS PRN
OUTPATIENT
Start: 2024-05-31 | End: 2024-06-01

## 2024-05-31 RX ORDER — OXYTOCIN/RINGER'S LACTATE 30/500 ML
95 PLASTIC BAG, INJECTION (ML) INTRAVENOUS ONCE AS NEEDED
Status: DISCONTINUED | OUTPATIENT
Start: 2024-05-31 | End: 2024-06-03 | Stop reason: HOSPADM

## 2024-05-31 RX ORDER — OXYTOCIN/RINGER'S LACTATE 30/500 ML
0-30 PLASTIC BAG, INJECTION (ML) INTRAVENOUS CONTINUOUS
Status: DISCONTINUED | OUTPATIENT
Start: 2024-05-31 | End: 2024-06-03 | Stop reason: HOSPADM

## 2024-05-31 RX ORDER — METHYLERGONOVINE MALEATE 0.2 MG/ML
200 INJECTION INTRAVENOUS ONCE AS NEEDED
Status: DISCONTINUED | OUTPATIENT
Start: 2024-05-31 | End: 2024-06-03 | Stop reason: HOSPADM

## 2024-05-31 RX ORDER — METHYLERGONOVINE MALEATE 0.2 MG/ML
200 INJECTION INTRAVENOUS ONCE AS NEEDED
Status: COMPLETED | OUTPATIENT
Start: 2024-05-31 | End: 2024-05-31

## 2024-05-31 RX ORDER — OXYTOCIN/RINGER'S LACTATE 30/500 ML
334 PLASTIC BAG, INJECTION (ML) INTRAVENOUS ONCE
Status: DISCONTINUED | OUTPATIENT
Start: 2024-05-31 | End: 2024-06-03 | Stop reason: HOSPADM

## 2024-05-31 RX ORDER — LIDOCAINE HYDROCHLORIDE 20 MG/ML
INJECTION, SOLUTION EPIDURAL; INFILTRATION; INTRACAUDAL; PERINEURAL
Status: COMPLETED | OUTPATIENT
Start: 2024-05-31 | End: 2024-05-31

## 2024-05-31 RX ORDER — DOCUSATE SODIUM 100 MG/1
200 CAPSULE, LIQUID FILLED ORAL 2 TIMES DAILY PRN
Status: DISCONTINUED | OUTPATIENT
Start: 2024-05-31 | End: 2024-06-03 | Stop reason: HOSPADM

## 2024-05-31 RX ORDER — TRANEXAMIC ACID 10 MG/ML
1000 INJECTION, SOLUTION INTRAVENOUS EVERY 30 MIN PRN
Status: DISCONTINUED | OUTPATIENT
Start: 2024-05-31 | End: 2024-06-03 | Stop reason: HOSPADM

## 2024-05-31 RX ADMIN — EPHEDRINE SULFATE 10 MG: 50 INJECTION INTRAVENOUS at 03:05

## 2024-05-31 RX ADMIN — CLINDAMYCIN PHOSPHATE 900 MG: 900 INJECTION, SOLUTION INTRAVENOUS at 07:05

## 2024-05-31 RX ADMIN — SODIUM CHLORIDE, POTASSIUM CHLORIDE, SODIUM LACTATE AND CALCIUM CHLORIDE: 600; 310; 30; 20 INJECTION, SOLUTION INTRAVENOUS at 01:05

## 2024-05-31 RX ADMIN — OXYTOCIN 2 MILLI-UNITS/MIN: 10 INJECTION INTRAVENOUS at 01:05

## 2024-05-31 RX ADMIN — ROPIVACAINE HYDROCHLORIDE 500 MCG: 10 INJECTION, SOLUTION EPIDURAL at 02:05

## 2024-05-31 RX ADMIN — METHYLERGONOVINE MALEATE 200 MCG: 0.2 INJECTION, SOLUTION INTRAMUSCULAR; INTRAVENOUS at 09:05

## 2024-05-31 RX ADMIN — LIDOCAINE HYDROCHLORIDE 200 MG: 20 INJECTION, SOLUTION EPIDURAL; INFILTRATION; INTRACAUDAL; PERINEURAL at 02:05

## 2024-05-31 RX ADMIN — GENTAMICIN SULFATE 370 MG: 40 INJECTION, SOLUTION INTRAMUSCULAR; INTRAVENOUS at 09:05

## 2024-05-31 RX ADMIN — CLINDAMYCIN PHOSPHATE 900 MG: 900 INJECTION, SOLUTION INTRAVENOUS at 11:05

## 2024-05-31 RX ADMIN — SODIUM CHLORIDE, POTASSIUM CHLORIDE, SODIUM LACTATE AND CALCIUM CHLORIDE: 600; 310; 30; 20 INJECTION, SOLUTION INTRAVENOUS at 12:05

## 2024-05-31 RX ADMIN — MORPHINE SULFATE 2 MG: 2 INJECTION, SOLUTION INTRAMUSCULAR; INTRAVENOUS at 12:05

## 2024-05-31 RX ADMIN — LIDOCAINE HYDROCHLORIDE 10 ML: 20 INJECTION, SOLUTION INTRAVENOUS at 02:05

## 2024-05-31 RX ADMIN — SODIUM CHLORIDE, POTASSIUM CHLORIDE, SODIUM LACTATE AND CALCIUM CHLORIDE: 600; 310; 30; 20 INJECTION, SOLUTION INTRAVENOUS at 03:05

## 2024-05-31 RX ADMIN — CLINDAMYCIN PHOSPHATE 900 MG: 900 INJECTION, SOLUTION INTRAVENOUS at 03:05

## 2024-05-31 NOTE — ANESTHESIA PROCEDURE NOTES
Epidural    Patient location during procedure: OB   Reason for block: primary anesthetic   Reason for block: labor analgesia requested by patient and obstetrician  Diagnosis: IUP   Start time: 5/31/2024 2:11 AM  Timeout: 5/31/2024 2:10 AM  End time: 5/31/2024 2:20 AM    Staffing  Performing Provider: Cody Duncan MD  Authorizing Provider: Cody Duncan MD    Staffing  Performed by: Cody Duncan MD  Authorized by: Cody Duncan MD        Preanesthetic Checklist  Completed: patient identified, pre-op evaluation, timeout performed, anesthesia consent given, hand hygiene performed and patient being monitored  Preparation  Patient position: sitting  Prep: Betadine  Reason for block: primary anesthetic   Epidural  Skin Anesthetic: lidocaine 1%  Administration type: single shot  Approach: midline  Interspace: L4-5    Injection technique: BEBETO air  Needle and Epidural Catheter  Insertion Attempts: 1  Test dose: 3 mL of lidocaine 1.5% with Epi 1-to-200,000  Additional Documentation: negative aspiration for heme and CSF  Needle localization: anatomical landmarks  Assessment  Ease of block: easy  Additional Notes  Informed consent. Aseptic technique.   L4/5 epidural catheter. Standard PCEA.   No complications.         Medications:    Medications: LIDOcaine (PF) 20 mg/mL (2%) injection - Epidural   10 mL - 5/31/2024 2:19:00 AM

## 2024-05-31 NOTE — ANESTHESIA PREPROCEDURE EVALUATION
2024  Alyssa Painter is a 29 y.o., female.      Pre-op Assessment    I have reviewed the Patient Summary Reports.       I have reviewed the Medications.     Review of Systems         Anesthesia Plan  Type of Anesthesia, risks & benefits discussed:    Anesthesia Type: Epidural  Intra-op Monitoring Plan: Standard ASA Monitors  Informed Consent: Informed consent signed with the Patient and all parties understand the risks and agree with anesthesia plan.  All questions answered.   ASA Score: 3    Ready For Surgery From Anesthesia Perspective.     .  No known anesthetic complications  Allergic to PCN    Hct 34     at 35 weeks  Morbid obesity (BMI 47)    Adequate ROM at neck    Plan is labor epidural

## 2024-05-31 NOTE — H&P
[facility] -  Labor and Delivery  Obstetrics  History & Physical    Patient Name: Alyssa Painter  MRN: [id number]  Admission Date: 2024  Primary Care Provider: Dorothy, Primary Doctor    Subjective:     Principal Problem:Prolonged premature rupture of membranes    History of Present Illness:  Patient is a 29-year-old  who presents at 31 weeks 6 days with complaints of uterine contractions leakage of amniotic fluid and vaginal bleeding for the past 24 hours she had a cerclage placed at 27 weeks gestation by Dr. Coleman.  This cerclage was removed in the OB ED with grossly ruptured membranes and vaginal bleeding noted.  Once the cerclage was removed the cervix was inspected and good hemostasis was noted.  Patient is 3 cm 90% and -2.  She is being admitted for delivery.      Obstetric HPI:  Patient reports Frequency: Every 2-4 minutes contractions, active fetal movement, present vaginal bleeding , present loss of fluid      Objective:      Vital Signs (Most Recent):  Pulse: (!) 114 (24)  BP: 122/66 (24) Vital Signs (24h Range):  Pulse:  [106-114] 114  BP: (112-122)/(65-66) 122/66      There is no height or weight on file to calculate BMI.     FHT: 140  Cat 1 (reassuring)  TOCO:  Q 2-4 minutes     No intake or output data in the 24 hours ending 24     Cervical Exam:  Dilation:            3  Effacement:            90  Station:            -2  Presentation:            Vertex      Significant Labs:  Recent Lab Results           24         Baso # 0.04          Basophil % 0.2          Differential Method Auto          Eos # 0.02          Eos % 0.1          Hematocrit 34.2          Hemoglobin 10.9          Immature Grans (Abs) 0.18          Immature Granulocytes 1.0          Lymph # 1.03          Lymph % 5.6          MCH 28.5          MCHC 31.9          MCV 89.5          Mono # 0.98          Mono % 5.4          MPV 11.2          Neutrophils, Abs 16.04           Neutrophils Relative 87.7          nRBC 0.0          NUCLEATED RBC ABSOLUTE 0.00          Platelet Count 220          RBC 3.82          RDW 13.7          WBC 18.29                        Review of patient's allergies indicates:   Allergen Reactions    Penicillin         Pt states she has been allergic since childhood.      History reviewed. No pertinent past medical history.        Past Surgical History:   Procedure Laterality Date    CERVICAL CERCLAGE N/A 5/2/2024     Procedure: CERCLAGE, CERVIX;  Surgeon: Amol Romero MD;  Location: [facility];  Service: OB/GYN;  Laterality: N/A;      No family history on file.  Social History   Social History            Tobacco Use    Smoking status: Never       Passive exposure: Never    Smokeless tobacco: Never   Substance Use Topics    Alcohol use: Never    Drug use: Never         Review of Systems   Constitutional:  Negative for appetite change, chills, fatigue and fever.   HENT:  Negative for congestion.    Eyes:  Negative for visual disturbance.   Respiratory:  Negative for cough, chest tightness and shortness of breath.    Cardiovascular:  Negative for chest pain, palpitations and leg swelling.   Gastrointestinal:  Positive for abdominal pain. Negative for abdominal distention, blood in stool, constipation, diarrhea, nausea and vomiting.   Endocrine: Negative for cold intolerance, heat intolerance, polydipsia, polyphagia and polyuria.   Genitourinary:  Positive for pelvic pain, vaginal bleeding and vaginal discharge. Negative for difficulty urinating, dyspareunia, dysuria, flank pain, frequency, urgency and vaginal pain.   Musculoskeletal:  Negative for arthralgias and back pain.   Skin: Negative.    Neurological:  Negative for headaches.   Psychiatric/Behavioral:  Negative for agitation, behavioral problems and confusion.          Physical Exam             Initial Vitals [05/30/24 1955]   BP Pulse Resp Temp SpO2   112/65 106 -- -- --       MAP           --               Physical Exam     Vitals reviewed.  Constitutional: She appears well-developed and well-nourished. No distress.   HENT:   Head: Normocephalic and atraumatic.   Nose: Nose normal.   Eyes: EOM are normal. Pupils are equal, round, and reactive to light.   Neck:   Normal range of motion.  Cardiovascular:  Normal rate.           Pulmonary/Chest: No respiratory distress.   Abdominal: Abdomen is soft. There is no abdominal tenderness.   Genitourinary:    Vagina normal.      Musculoskeletal:         General: No edema.      Cervical back: Normal range of motion.      Neurological: She is alert and oriented to person, place, and time.   Skin: Skin is warm and dry.   Psychiatric: She has a normal mood and affect. Thought content normal.            ED Course            Labs Reviewed   CBC WITH DIFFERENTIAL - Abnormal; Notable for the following components:       Result Value      WBC 18.29 (*)       RBC 3.82 (*)       Hemoglobin 10.9 (*)       Hematocrit 34.2 (*)       MCHC 31.9 (*)       Neutrophils % 87.7 (*)       Lymphocytes % 5.6 (*)       Eosinophils % 0.1 (*)       Immature Granulocytes % 1.0 (*)       Neutrophils, Abs 16.04 (*)       Monocytes, Absolute 0.98 (*)       Immature Granulocytes, Absolute 0.18 (*)       All other components within normal limits   CULTURE, ANAEROBE   CULTURE, GENITAL   CBC W/ AUTO DIFFERENTIAL     Narrative:      The following orders were created for panel order CBC auto differential.  Procedure                               Abnormality         Status                     ---------                               -----------         ------                     CBC with Differential[[id number]]       Abnormal            Final result                  Please view results for these tests on the individual orders.   URINALYSIS   EXTRA TUBES     Narrative:      The following orders were created for panel order EXTRA TUBES.  Procedure                               Abnormality         Status                      ---------                               -----------         ------                     Gold Top Hold[[id number]]                                                                 Please view results for these tests on the individual orders.   GOLD TOP HOLD         Imaging Results                  US OB Limited with Biophysical Profile (xpd) (In process)                           Medical Decision Making  Patient presents with complaints of vaginal bleeding leakage of amniotic fluid and irregular contractions for the past 24 hours (at least) patient had a cerclage placed at 27 weeks which was removed today in the OB ED for pre mature rupture of membranes.  Cervix is 3 cm 90% and -2.     Amount and/or Complexity of Data Reviewed  Labs: ordered.  Radiology: ordered.     Risk  Prescription drug management.  Decision regarding hospitalization.        Medications   sodium chloride 0.9% flush 10 mL (has no administration in time range)   clindamycin in D5W 900 mg/50 mL IVPB 900 mg (has no administration in time range)                Plan            ED Course as of 248   Thu May 30, 2024   2218 Patient presents with complaints of vaginal bleeding and uterine contractions and leakage of amniotic fluid.  Contractions are every 2-3 minutes apart with cervical dilatation of 3 cm 90% and -2.  The cerclage was removed.  WBC 18.29 H&H 10.9 and 34.2 platelet count 220.  Patient will be admitted for delivery. [JA]    YUDY of 7 cm was obtained. [JA]       ED Course User Index  [JA] James Bonds MD                                             Assessment/Plan:     29 y.o. female  at 31w6d for:    * Prolonged premature rupture of membranes  Spontaneous rupture membranes at least 24 hours    31 weeks gestation of pregnancy  Spontaneous rupture membranes     labor in third trimester without delivery  Cerclage removed spontaneous vaginal delivery is anticipated        James Bonds  MD  Obstetrics  [facility] -  Labor and Delivery

## 2024-05-31 NOTE — ED PROVIDER NOTES
Encounter Date: 2024    FERN Physician: James Bonds   Primary OBGYN:Dr. Romero    Admit Diagnosis/Chief Complaint: Vaginal Bleeding  History     Chief Complaint   Patient presents with    Vaginal Bleeding     Patient is a 29-year-old  1 para 0 who presents at 31 weeks and 6 days with complaints of vaginal bleeding leakage of amniotic fluid and uterine contractions that started  more than 24 hours ago..  She has a cerclage that was placed on 2024 at 27 weeks gestation.  Ultrasound confirms an YUDY of 7 cm sterile speculum confirms pooling.  The cerclage was removed cervix was 3 cm 90% -2.  Aerobic and anaerobic cultures were obtained.  Patient will be admitted for delivery.        Obstetric HPI:  Patient reports Frequency: Every 2-4 minutes contractions, active fetal movement, present vaginal bleeding , present loss of fluid      Objective:     Vital Signs (Most Recent):  Pulse: (!) 114 (24)  BP: 122/66 (24) Vital Signs (24h Range):  Pulse:  [106-114] 114  BP: (112-122)/(65-66) 122/66        There is no height or weight on file to calculate BMI.    FHT: 140  Cat 1 (reassuring)  TOCO:  Q 2-4 minutes    No intake or output data in the 24 hours ending 24    Cervical Exam:  Dilation:  3  Effacement:  90  Station: -2  Presentation: Vertex     Significant Labs:  Recent Lab Results         24        Baso # 0.04       Basophil % 0.2       Differential Method Auto       Eos # 0.02       Eos % 0.1       Hematocrit 34.2       Hemoglobin 10.9       Immature Grans (Abs) 0.18       Immature Granulocytes 1.0       Lymph # 1.03       Lymph % 5.6       MCH 28.5       MCHC 31.9       MCV 89.5       Mono # 0.98       Mono % 5.4       MPV 11.2       Neutrophils, Abs 16.04       Neutrophils Relative 87.7       nRBC 0.0       NUCLEATED RBC ABSOLUTE 0.00       Platelet Count 220       RBC 3.82       RDW 13.7       WBC 18.29             Review of patient's allergies  indicates:   Allergen Reactions    Penicillin      Pt states she has been allergic since childhood.     History reviewed. No pertinent past medical history.  Past Surgical History:   Procedure Laterality Date    CERVICAL CERCLAGE N/A 5/2/2024    Procedure: CERCLAGE, CERVIX;  Surgeon: Amol Romero MD;  Location: [facility];  Service: OB/GYN;  Laterality: N/A;     No family history on file.  Social History     Tobacco Use    Smoking status: Never     Passive exposure: Never    Smokeless tobacco: Never   Substance Use Topics    Alcohol use: Never    Drug use: Never     Review of Systems   Constitutional:  Negative for appetite change, chills, fatigue and fever.   HENT:  Negative for congestion.    Eyes:  Negative for visual disturbance.   Respiratory:  Negative for cough, chest tightness and shortness of breath.    Cardiovascular:  Negative for chest pain, palpitations and leg swelling.   Gastrointestinal:  Positive for abdominal pain. Negative for abdominal distention, blood in stool, constipation, diarrhea, nausea and vomiting.   Endocrine: Negative for cold intolerance, heat intolerance, polydipsia, polyphagia and polyuria.   Genitourinary:  Positive for pelvic pain, vaginal bleeding and vaginal discharge. Negative for difficulty urinating, dyspareunia, dysuria, flank pain, frequency, urgency and vaginal pain.   Musculoskeletal:  Negative for arthralgias and back pain.   Skin: Negative.    Neurological:  Negative for headaches.   Psychiatric/Behavioral:  Negative for agitation, behavioral problems and confusion.        Physical Exam     Initial Vitals [05/30/24 1955]   BP Pulse Resp Temp SpO2   112/65 106 -- -- --      MAP       --         Physical Exam    Vitals reviewed.  Constitutional: She appears well-developed and well-nourished. No distress.   HENT:   Head: Normocephalic and atraumatic.   Nose: Nose normal.   Eyes: EOM are normal. Pupils are equal, round, and reactive to light.   Neck:   Normal  range of motion.  Cardiovascular:  Normal rate.           Pulmonary/Chest: No respiratory distress.   Abdominal: Abdomen is soft. There is no abdominal tenderness.   Genitourinary:    Vagina normal.     Musculoskeletal:         General: No edema.      Cervical back: Normal range of motion.     Neurological: She is alert and oriented to person, place, and time.   Skin: Skin is warm and dry.   Psychiatric: She has a normal mood and affect. Thought content normal.         ED Course     Labs Reviewed   CBC WITH DIFFERENTIAL - Abnormal; Notable for the following components:       Result Value    WBC 18.29 (*)     RBC 3.82 (*)     Hemoglobin 10.9 (*)     Hematocrit 34.2 (*)     MCHC 31.9 (*)     Neutrophils % 87.7 (*)     Lymphocytes % 5.6 (*)     Eosinophils % 0.1 (*)     Immature Granulocytes % 1.0 (*)     Neutrophils, Abs 16.04 (*)     Monocytes, Absolute 0.98 (*)     Immature Granulocytes, Absolute 0.18 (*)     All other components within normal limits   CULTURE, ANAEROBE   CULTURE, GENITAL   CBC W/ AUTO DIFFERENTIAL    Narrative:     The following orders were created for panel order CBC auto differential.  Procedure                               Abnormality         Status                     ---------                               -----------         ------                     CBC with Differential[[id number]]       Abnormal            Final result                 Please view results for these tests on the individual orders.   URINALYSIS   EXTRA TUBES    Narrative:     The following orders were created for panel order EXTRA TUBES.  Procedure                               Abnormality         Status                     ---------                               -----------         ------                     Gold Top Hold[[id number]]                                                                Please view results for these tests on the individual orders.   GOLD TOP HOLD        Imaging Results              US OB Limited  with Biophysical Profile (xpd) (In process)                      Medical Decision Making  Patient presents with complaints of vaginal bleeding leakage of amniotic fluid and irregular contractions for the past 24 hours (at least) patient had a cerclage placed at 27 weeks which was removed today in the OB ED for pre mature rupture of membranes.  Cervix is 3 cm 90% and -2.    Amount and/or Complexity of Data Reviewed  Labs: ordered.  Radiology: ordered.    Risk  Prescription drug management.  Decision regarding hospitalization.       Medications   sodium chloride 0.9% flush 10 mL (has no administration in time range)   clindamycin in D5W 900 mg/50 mL IVPB 900 mg (has no administration in time range)                 ED Course as of 24 2248   Thu May 30, 2024   2218 Patient presents with complaints of vaginal bleeding and uterine contractions and leakage of amniotic fluid.  Contractions are every 2-3 minutes apart with cervical dilatation of 3 cm 90% and -2.  The cerclage was removed.  WBC 18.29 H&H 10.9 and 34.2 platelet count 220.  Patient will be admitted for delivery. [JA]    YUDY of 7 cm was obtained. [JA]      ED Course User Index  [JA] James Bonds MD                 Clinical Impression:   Final diagnoses:  [O60.03]  labor in third trimester without delivery  [O42.10] Prolonged premature rupture of membranes (Primary)  [Z3A.31] 31 weeks gestation of pregnancy        ED Disposition Condition    Admit Stable                James Bonds MD  [phone]

## 2024-05-31 NOTE — HPI
Patient is a 29-year-old  who presents at 31 weeks 6 days with complaints of uterine contractions leakage of amniotic fluid and vaginal bleeding for the past 24 hours she had a cerclage placed at 27 weeks gestation by Dr. Coleman.  This cerclage was removed in the OB ED with grossly ruptured membranes and vaginal bleeding noted.  Once the cerclage was removed the cervix was inspected and good hemostasis was noted.  Patient is 3 cm 90% and -2.  She is being admitted for delivery.

## 2024-05-31 NOTE — L&D DELIVERY NOTE
Vaginal Delivery Note  Date of Delivery: 2024      Patient is a G1 now  s/p  of viable infant over intact perineum from [place] at 0942 hrs.    OB: MD Vidhi    Assistant: none    :    LMI, Apgars 8,9 , Weight 1859 grams, at 0942 hrs.     Cord Gases:    UA: 7.23 / 56 / 23.5 / -4.8    UV: 7.30 / 41 / 20.2 / -5.9    Perineum:  Intact -  No laceration(s)    Cervix:  No cervical lacerations    Placenta:   Spontaneous, 3-vessel cord, intact  Placenta evaluated and complete     Specimen: Placenta sent to pathology .    EBL: 300 ml    Complications: none    Miscellaneous: Pitocin infusing. Uterus firm / involuting as expected    Disposition: Patient in room in good condition.      Details:  P  Patient proceeded to complete and pushed effectively delivering her  over an intact perineum.   Anterior shoulder delivered spontaneously assisted with gentle traction.   Single nuchal cord reduced.  Mouth and nose suctioned with bulb syringe.   Infant handed off to NICU team present for delivery.   Delayed cord clamping for approx 30 sec.   Active management of third stage performed.     Patient and  in good and stable condition prior to my exit from the room.    Specimens from placenta for aerobic and anaerobic cultures obtained.  Cord gases obtained.    Patient and her 's questions encouraged and answered to their apparent satisfaction.      Lorenzo Mosher MD  OB Hospitalist  Hospitalist phone: [phone]  2024   2:10 PM

## 2024-05-31 NOTE — PLAN OF CARE
[facility] -  Labor and Delivery  Initial Discharge Assessment       Primary Care Provider: No, Primary Doctor    Admission Diagnosis: Prolonged premature rupture of membranes [O42.10]   labor in third trimester without delivery [O60.03]    Admission Date: 2024  Expected Discharge Date:     Transition of Care Barriers: None    Payor: BLUE CROSS BLUE SHIELD / Plan: BCBS FEDERAL BASIC / Product Type: PPO /     Extended Emergency Contact Information  Primary Emergency Contact: TOLU CHRISTINA  Mobile Phone: [phone]  Relation: Other   needed? No    Discharge Plan A: Home with family  Discharge Plan B: Home with family      [facility] [address] [address]  Phone: [phone] Fax: [phone]      Initial Assessment (most recent)       Adult Discharge Assessment - 24 1505          Discharge Assessment    Assessment Type Discharge Planning Assessment     Source of Information patient     People in Home significant other     Do you expect to return to your current living situation? Yes     Do you have help at home or someone to help you manage your care at home? Yes     Who are your caregiver(s) and their phone number(s)? sig other     Prior to hospitilization cognitive status: Alert/Oriented     Current cognitive status: Alert/Oriented     Walking or Climbing Stairs Difficulty no     Dressing/Bathing Difficulty no     Home Layout Able to live on 1st floor     Equipment Currently Used at Home none     Patient currently being followed by outpatient case management? No     Do you currently have service(s) that help you manage your care at home? No     How do you get to doctors appointments? car, drives self     Discharge Plan A Home with family     Discharge Plan B Home with family     DME Needed Upon Discharge  none     Discharge Plan discussed with: Patient     Transition of Care Barriers None                  Pt lives home with sig other, has all needs to care for infant at dc, reports that infant [place] be on [place] medicaid and is interested in [facility], ss to send to [facility] upon baby boy dc, also will fax clinicals to tyrell coats

## 2024-05-31 NOTE — PROGRESS NOTES
Pharmacokinetic Initial Assessment: Gentamicin    Assessment:  Pharmacy has been consulted to dose gentamicin as 5 mg/kg IV daily.      Plan: Gent has been dosed as 370 mg (based on adjusted body weight as determined with pre pregnancy weight) IV daily.  A gent trough has been ordered for 5/31 at 0930.    Pharmacy will continue to monitor.    Please contact pharmacy at extension [phone] with any questions regarding this assessment.    Patient brief summary:  Alyssa Painter is a 29 y.o. female initiated on aminoglycoside therapy for treatment of suspected  chorioamnionitis    Drug Allergies:   Review of patient's allergies indicates:   Allergen Reactions    Penicillin      Pt states she has been allergic since childhood.       Actual Body Weight:   108.2 kg (pre-pregnancy weight per nurses)    Adjust Body Weight:   73.3 kg    Ideal Body Weight:  50.1 kg    Renal Function:   Estimated Creatinine Clearance: 172.3 mL/min (based on SCr of 0.59 mg/dL).,     Dialysis Method (if applicable):  N/A    CBC (last 72 hours):  Recent Labs   Lab Result Units 05/30/24 2014 05/31/24  0717   WBC K/uL 18.29* 17.37*   Hemoglobin g/dL 10.9* 9.5*   Hematocrit % 34.2* 29.9*   Platelet Count K/uL 220 187   Lymphocytes % % 5.6* 5.2*   Monocytes % % 5.4 5.4   Eosinophils % % 0.1* 0.1*   Basophils % % 0.2 0.2   Diff Type  Auto Auto       Metabolic Panel (last 72 hours):  Recent Labs   Lab Result Units 05/30/24 2324 05/31/24 0717   Sodium mmol/L  --  136   Potassium mmol/L  --  4.0   Chloride mmol/L  --  109*   CO2 mmol/L  --  21   Glucose mg/dL  --  85   Glucose, UA mg/dL Normal  --    BUN mg/dL  --  9   Creatinine mg/dL  --  0.59   Albumin g/dL  --  2.1*   Bilirubin, Total mg/dL  --  0.6   Alk Phos U/L  --  102*   AST U/L  --  14*   ALT U/L  --  11*       Microbiologic Results:  Microbiology Results (last 7 days)       Procedure Component Value Units Date/Time    Urine culture [[id number]] Collected: 05/30/24 2324    Order Status:  Completed Specimen: Urine, Clean Catch Updated: 05/31/24 0921     Culture, Urine No Growth To Date    Urine culture [[id number]] Collected: 05/30/24 2324    Order Status: Canceled Specimen: Urine, Clean Catch Updated: 05/31/24 0022    Culture, Genital [[id number]] Collected: 05/30/24 2155    Order Status: Sent Specimen: Genital from Cervix Updated: 05/30/24 2202    Culture, Anaerobe [[id number]] Collected: 05/30/24 2155    Order Status: Sent Specimen: Body Fluid from Cervix Updated: 05/30/24 2202

## 2024-05-31 NOTE — PLAN OF CARE
[facility] -  Labor and Delivery  OB Initial Discharge Assessment       Primary Care Provider: No, Primary Doctor    Expected Discharge Date:     Initial Assessment (most recent)       OB Discharge Planning Assessment - 05/31/24 0936          OB Discharge Planning Assessment    Assessment Type Discharge Planning Assessment     Discharge Plan A Home with family     Discharge Plan B Home with family                            Healthcare Directives:   Advance Directive  (If Adv Dir status is received, view document under Adv Dir in header or Chart Review Media tab): Patient does not have Advance Directive, declines information.         Nurse in room, unable to speak to pt at this time as she is in labor, pt did not show up in [facility] myblue portal and had to be manually added and dc plan entered, will fax at dc, will complete assessment after delivery

## 2024-05-31 NOTE — SUBJECTIVE & OBJECTIVE
Obstetric HPI:  Patient reports Frequency: Every 2-4 minutes contractions, active fetal movement, present vaginal bleeding , present loss of fluid      Objective:      Vital Signs (Most Recent):  Pulse: (!) 114 (05/30/24 2245)  BP: 122/66 (05/30/24 2245) Vital Signs (24h Range):  Pulse:  [106-114] 114  BP: (112-122)/(65-66) 122/66      There is no height or weight on file to calculate BMI.     FHT: 140  Cat 1 (reassuring)  TOCO:  Q 2-4 minutes     No intake or output data in the 24 hours ending 05/30/24 2248     Cervical Exam:  Dilation:            3  Effacement:            90  Station:            -2  Presentation:            Vertex      Significant Labs:  Recent Lab Results           05/30/24 2014         Baso # 0.04          Basophil % 0.2          Differential Method Auto          Eos # 0.02          Eos % 0.1          Hematocrit 34.2          Hemoglobin 10.9          Immature Grans (Abs) 0.18          Immature Granulocytes 1.0          Lymph # 1.03          Lymph % 5.6          MCH 28.5          MCHC 31.9          MCV 89.5          Mono # 0.98          Mono % 5.4          MPV 11.2          Neutrophils, Abs 16.04          Neutrophils Relative 87.7          nRBC 0.0          NUCLEATED RBC ABSOLUTE 0.00          Platelet Count 220          RBC 3.82          RDW 13.7          WBC 18.29                        Review of patient's allergies indicates:   Allergen Reactions    Penicillin         Pt states she has been allergic since childhood.      History reviewed. No pertinent past medical history.        Past Surgical History:   Procedure Laterality Date    CERVICAL CERCLAGE N/A 5/2/2024     Procedure: CERCLAGE, CERVIX;  Surgeon: Amol Romero MD;  Location: [facility];  Service: OB/GYN;  Laterality: N/A;      No family history on file.  Social History   Social History            Tobacco Use    Smoking status: Never       Passive exposure: Never    Smokeless tobacco: Never   Substance Use Topics     Alcohol use: Never    Drug use: Never         Review of Systems   Constitutional:  Negative for appetite change, chills, fatigue and fever.   HENT:  Negative for congestion.    Eyes:  Negative for visual disturbance.   Respiratory:  Negative for cough, chest tightness and shortness of breath.    Cardiovascular:  Negative for chest pain, palpitations and leg swelling.   Gastrointestinal:  Positive for abdominal pain. Negative for abdominal distention, blood in stool, constipation, diarrhea, nausea and vomiting.   Endocrine: Negative for cold intolerance, heat intolerance, polydipsia, polyphagia and polyuria.   Genitourinary:  Positive for pelvic pain, vaginal bleeding and vaginal discharge. Negative for difficulty urinating, dyspareunia, dysuria, flank pain, frequency, urgency and vaginal pain.   Musculoskeletal:  Negative for arthralgias and back pain.   Skin: Negative.    Neurological:  Negative for headaches.   Psychiatric/Behavioral:  Negative for agitation, behavioral problems and confusion.          Physical Exam             Initial Vitals [05/30/24 1955]   BP Pulse Resp Temp SpO2   112/65 106 -- -- --       MAP           --              Physical Exam     Vitals reviewed.  Constitutional: She appears well-developed and well-nourished. No distress.   HENT:   Head: Normocephalic and atraumatic.   Nose: Nose normal.   Eyes: EOM are normal. Pupils are equal, round, and reactive to light.   Neck:   Normal range of motion.  Cardiovascular:  Normal rate.           Pulmonary/Chest: No respiratory distress.   Abdominal: Abdomen is soft. There is no abdominal tenderness.   Genitourinary:    Vagina normal.      Musculoskeletal:         General: No edema.      Cervical back: Normal range of motion.      Neurological: She is alert and oriented to person, place, and time.   Skin: Skin is warm and dry.   Psychiatric: She has a normal mood and affect. Thought content normal.            ED Course            Labs Reviewed   CBC  WITH DIFFERENTIAL - Abnormal; Notable for the following components:       Result Value      WBC 18.29 (*)       RBC 3.82 (*)       Hemoglobin 10.9 (*)       Hematocrit 34.2 (*)       MCHC 31.9 (*)       Neutrophils % 87.7 (*)       Lymphocytes % 5.6 (*)       Eosinophils % 0.1 (*)       Immature Granulocytes % 1.0 (*)       Neutrophils, Abs 16.04 (*)       Monocytes, Absolute 0.98 (*)       Immature Granulocytes, Absolute 0.18 (*)       All other components within normal limits   CULTURE, ANAEROBE   CULTURE, GENITAL   CBC W/ AUTO DIFFERENTIAL     Narrative:      The following orders were created for panel order CBC auto differential.  Procedure                               Abnormality         Status                     ---------                               -----------         ------                     CBC with Differential[[id number]]       Abnormal            Final result                  Please view results for these tests on the individual orders.   URINALYSIS   EXTRA TUBES     Narrative:      The following orders were created for panel order EXTRA TUBES.  Procedure                               Abnormality         Status                     ---------                               -----------         ------                     Gold Top Hold[[id number]]                                                                 Please view results for these tests on the individual orders.   GOLD TOP HOLD         Imaging Results                  US OB Limited with Biophysical Profile (xpd) (In process)                           Medical Decision Making  Patient presents with complaints of vaginal bleeding leakage of amniotic fluid and irregular contractions for the past 24 hours (at least) patient had a cerclage placed at 27 weeks which was removed today in the OB ED for pre mature rupture of membranes.  Cervix is 3 cm 90% and -2.     Amount and/or Complexity of Data Reviewed  Labs: ordered.  Radiology: ordered.      Risk  Prescription drug management.  Decision regarding hospitalization.        Medications   sodium chloride 0.9% flush 10 mL (has no administration in time range)   clindamycin in D5W 900 mg/50 mL IVPB 900 mg (has no administration in time range)                Plan            ED Course as of 05/30/24 2248   Thu May 30, 2024   2218 Patient presents with complaints of vaginal bleeding and uterine contractions and leakage of amniotic fluid.  Contractions are every 2-3 minutes apart with cervical dilatation of 3 cm 90% and -2.  The cerclage was removed.  WBC 18.29 H&H 10.9 and 34.2 platelet count 220.  Patient will be admitted for delivery. [JA]   2221 YUDY of 7 cm was obtained. [JA]       ED Course User Index  [JA] James Bonds MD

## 2024-06-01 PROBLEM — O41.1290: Status: ACTIVE | Noted: 2024-06-01

## 2024-06-01 PROBLEM — Z3A.32 32 WEEKS GESTATION OF PREGNANCY: Status: ACTIVE | Noted: 2024-06-01

## 2024-06-01 PROBLEM — B96.89: Status: ACTIVE | Noted: 2024-06-01

## 2024-06-01 LAB
BACTERIA BIOCHEM PROFILE ISLT CULT: NORMAL
BASOPHILS # BLD AUTO: 0.05 K/UL (ref 0–0.2)
BASOPHILS NFR BLD AUTO: 0.3 % (ref 0–1)
DIFFERENTIAL METHOD BLD: ABNORMAL
EOSINOPHIL # BLD AUTO: 0.13 K/UL (ref 0–0.5)
EOSINOPHIL NFR BLD AUTO: 0.7 % (ref 1–4)
ERYTHROCYTE [DISTWIDTH] IN BLOOD BY AUTOMATED COUNT: 13.7 % (ref 11.5–14.5)
GENTAMICIN TROUGH SERPL-MCNC: <0.2 ΜG/ML (ref 0.5–1.9)
HCT VFR BLD AUTO: 28.6 % (ref 38–47)
HGB BLD-MCNC: 9.2 G/DL (ref 12–16)
IMM GRANULOCYTES # BLD AUTO: 0.29 K/UL (ref 0–0.04)
IMM GRANULOCYTES NFR BLD: 1.6 % (ref 0–0.4)
LYMPHOCYTES # BLD AUTO: 1.71 K/UL (ref 1–4.8)
LYMPHOCYTES NFR BLD AUTO: 9.5 % (ref 27–41)
MCH RBC QN AUTO: 28.3 PG (ref 27–31)
MCHC RBC AUTO-ENTMCNC: 32.2 G/DL (ref 32–36)
MCV RBC AUTO: 88 FL (ref 80–96)
MONOCYTES # BLD AUTO: 1.17 K/UL (ref 0–0.8)
MONOCYTES NFR BLD AUTO: 6.5 % (ref 2–6)
MPC BLD CALC-MCNC: 11 FL (ref 9.4–12.4)
NEUTROPHILS # BLD AUTO: 14.67 K/UL (ref 1.8–7.7)
NEUTROPHILS NFR BLD AUTO: 81.4 % (ref 53–65)
NRBC # BLD AUTO: 0 X10E3/UL
NRBC, AUTO (.00): 0 %
PLATELET # BLD AUTO: 179 K/UL (ref 150–400)
RBC # BLD AUTO: 3.25 M/UL (ref 4.2–5.4)
UA COMPLETE W REFLEX CULTURE PNL UR: NORMAL
WBC # BLD AUTO: 18.02 K/UL (ref 4.5–11)

## 2024-06-01 PROCEDURE — 36415 COLL VENOUS BLD VENIPUNCTURE: CPT | Performed by: OBSTETRICS & GYNECOLOGY

## 2024-06-01 PROCEDURE — 80170 ASSAY OF GENTAMICIN: CPT | Performed by: OBSTETRICS & GYNECOLOGY

## 2024-06-01 PROCEDURE — 85025 COMPLETE CBC W/AUTO DIFF WBC: CPT | Performed by: OBSTETRICS & GYNECOLOGY

## 2024-06-01 PROCEDURE — 63600175 PHARM REV CODE 636 W HCPCS: Performed by: OBSTETRICS & GYNECOLOGY

## 2024-06-01 PROCEDURE — 11000001 HC ACUTE MED/SURG PRIVATE ROOM

## 2024-06-01 PROCEDURE — 25000003 PHARM REV CODE 250: Performed by: OBSTETRICS & GYNECOLOGY

## 2024-06-01 PROCEDURE — 99232 SBSQ HOSP IP/OBS MODERATE 35: CPT | Mod: ,,, | Performed by: OBSTETRICS & GYNECOLOGY

## 2024-06-01 RX ADMIN — Medication 1 TABLET: at 09:06

## 2024-06-01 RX ADMIN — ACETAMINOPHEN 650 MG: 325 TABLET ORAL at 06:06

## 2024-06-01 RX ADMIN — GENTAMICIN SULFATE 370 MG: 40 INJECTION, SOLUTION INTRAMUSCULAR; INTRAVENOUS at 09:06

## 2024-06-01 RX ADMIN — ACETAMINOPHEN 650 MG: 325 TABLET ORAL at 07:06

## 2024-06-01 RX ADMIN — CLINDAMYCIN PHOSPHATE 900 MG: 900 INJECTION, SOLUTION INTRAVENOUS at 03:06

## 2024-06-01 RX ADMIN — ACETAMINOPHEN 650 MG: 325 TABLET ORAL at 12:06

## 2024-06-01 RX ADMIN — CLINDAMYCIN PHOSPHATE 900 MG: 900 INJECTION, SOLUTION INTRAVENOUS at 07:06

## 2024-06-01 NOTE — PROGRESS NOTES
Pharmacy Consult    Consulted to assist in the management of Gent therapy.  Patient is currently receiving Gent 370mg iv q24hr.  Trough drawn at 0930 on 6/1. Reported as <0.2 mcg/ml.  BUN/SCR = no level for 6/1.  Since trough Is in appropriate range, no changes needed at this time.  Will continue to monitor and make adjustment as necessary.      CODY Grubbs.Ph.

## 2024-06-01 NOTE — PLAN OF CARE
Preparing for discharge  Problem:  Fall Injury Risk  Goal: Absence of Fall, Infant Drop and Related Injury  Outcome: Progressing     Problem: Adult Inpatient Plan of Care  Goal: Plan of Care Review  Outcome: Progressing  Goal: Patient-Specific Goal (Individualized)  Outcome: Progressing  Goal: Absence of Hospital-Acquired Illness or Injury  Outcome: Progressing  Goal: Optimal Comfort and Wellbeing  Outcome: Progressing  Goal: Readiness for Transition of Care  Outcome: Progressing     Problem: Bariatric Environmental Safety  Goal: Safety Maintained with Care  Outcome: Progressing     Problem: Labor  Goal: Hemostasis  Outcome: Progressing  Goal: Stable Fetal Wellbeing  Outcome: Progressing  Goal: Effective Progression to Delivery  Outcome: Progressing  Goal: Absence of Infection Signs and Symptoms  Outcome: Progressing  Goal: Acceptable Pain Control  Outcome: Progressing  Goal: Normal Uterine Contraction Pattern  Outcome: Progressing     Problem: Prelabor Rupture of Membranes  Goal: Delayed Delivery with Absence of Infection  Outcome: Progressing     Problem: Postpartum (Vaginal Delivery)  Goal: Successful Parent Role Transition  Outcome: Progressing  Goal: Hemostasis  Outcome: Progressing  Goal: Absence of Infection Signs and Symptoms  Outcome: Progressing  Goal: Anesthesia/Sedation Recovery  Outcome: Progressing  Goal: Optimal Pain Control and Function  Outcome: Progressing  Goal: Effective Urinary Elimination  Outcome: Progressing   Preparing for discharge

## 2024-06-01 NOTE — PROGRESS NOTES
[facility] -  Labor and Delivery  Obstetrics  Postpartum Progress Note    Patient Name: Alyssa Painter  MRN: [id number]  Admission Date: 2024  Hospital Length of Stay: 2 days  Attending Physician: James Bonds MD  Primary Care Provider: Dorothy, Primary Doctor    Subjective:     Principal Problem:Prolonged premature rupture of membranes; cerclage removal on 24 in [facility]    Hospital course: Pt admitted on 24.  She delivered on 24. She remains on clindamycin and gentamicin with cerclage removal and suspected infection.    Interval History:     She is doing well this morning. She is tolerating a regular diet without nausea or vomiting. She is voiding spontaneously. She is ambulating.Vaginal bleeding is mild. She denies fever or chills. Abdominal pain is mild and controlled with oral medications. She Is breastfeeding.      Objective:     Vital Signs (Most Recent):  Temp: 97.6 °F (36.4 °C) (24)  Pulse: 69 (24)  Resp: 20 (24)  BP: (!) 108/57 (24)  SpO2: 96 % (24) Vital Signs (24h Range):  Temp:  [97.5 °F (36.4 °C)-98.3 °F (36.8 °C)] 97.6 °F (36.4 °C)  Pulse:  [] 69  Resp:  [19-20] 20  SpO2:  [96 %-99 %] 96 %  BP: ()/(46-61) 108/57     Weight: 117 kg (258 lb)  Body mass index is 47.19 kg/m².      Intake/Output Summary (Last 24 hours) at 2024 1149  Last data filed at 2024 1533  Gross per 24 hour   Intake --   Output 725 ml   Net -725 ml       Physical Exam    Significant Labs:  Lab Results   Component Value Date    GROUPTRH O POS 2024    HEPBSAG Non-Reactive 2024     Recent Labs   Lab 24  0515   HGB 9.2*   HCT 28.6*       I have personallly reviewed all pertinent lab results from the last 24 hours.    Assessment/Plan:     29 y.o. female  at 32w0d for:    Active Diagnoses:    Diagnosis Date Noted POA    PRINCIPAL PROBLEM:  Prolonged premature rupture of membranes [O42.10] 2024 Unknown     Chorioamnionitis due to bacteria [O41.1290, B96.89] 2024 Unknown     (normal spontaneous vaginal delivery) [O80] 2024 Not Applicable    32 weeks gestation of pregnancy [Z3A.32] 2024 Not Applicable     labor in third trimester without delivery [O60.03] 2024 Yes    31 weeks gestation of pregnancy [Z3A.31] 2024 Not Applicable      Problems Resolved During this Admission:     Continue IV abx for now.       Disposition: As patient meets milestones, will plan to discharge  tomorrow if remains afebrile and continues to do well.   .    Makayla Beckham, DO  Obstetrics  [facility] -  Labor and Delivery

## 2024-06-01 NOTE — PLAN OF CARE
Problem:  Fall Injury Risk  Goal: Absence of Fall, Infant Drop and Related Injury  Outcome: Progressing     Problem: Adult Inpatient Plan of Care  Goal: Plan of Care Review  Outcome: Progressing  Goal: Patient-Specific Goal (Individualized)  Outcome: Progressing  Goal: Absence of Hospital-Acquired Illness or Injury  Outcome: Progressing  Goal: Optimal Comfort and Wellbeing  Outcome: Progressing  Goal: Readiness for Transition of Care  Outcome: Progressing     Problem: Bariatric Environmental Safety  Goal: Safety Maintained with Care  Outcome: Progressing     Problem: Prelabor Rupture of Membranes  Goal: Delayed Delivery with Absence of Infection  Outcome: Progressing     Problem: Postpartum (Vaginal Delivery)  Goal: Successful Parent Role Transition  Outcome: Progressing  Goal: Hemostasis  Outcome: Progressing  Goal: Absence of Infection Signs and Symptoms  Outcome: Progressing  Goal: Anesthesia/Sedation Recovery  Outcome: Progressing  Goal: Optimal Pain Control and Function  Outcome: Progressing  Goal: Effective Urinary Elimination  Outcome: Progressing

## 2024-06-02 LAB
BACTERIA TISS AEROBE CULT: NO GROWTH
BACTERIA TISS AEROBE CULT: NORMAL

## 2024-06-02 PROCEDURE — 11000001 HC ACUTE MED/SURG PRIVATE ROOM

## 2024-06-02 PROCEDURE — 63600175 PHARM REV CODE 636 W HCPCS: Performed by: OBSTETRICS & GYNECOLOGY

## 2024-06-02 PROCEDURE — 25000003 PHARM REV CODE 250: Performed by: OBSTETRICS & GYNECOLOGY

## 2024-06-02 PROCEDURE — 99232 SBSQ HOSP IP/OBS MODERATE 35: CPT | Mod: ,,, | Performed by: OBSTETRICS & GYNECOLOGY

## 2024-06-02 RX ADMIN — ACETAMINOPHEN 650 MG: 325 TABLET ORAL at 12:06

## 2024-06-02 RX ADMIN — Medication 1 TABLET: at 09:06

## 2024-06-02 RX ADMIN — ACETAMINOPHEN 650 MG: 325 TABLET ORAL at 06:06

## 2024-06-02 RX ADMIN — GENTAMICIN SULFATE 370 MG: 40 INJECTION, SOLUTION INTRAMUSCULAR; INTRAVENOUS at 09:06

## 2024-06-02 NOTE — PROGRESS NOTES
"[facility] -  Labor and Delivery  Obstetrics  Postpartum Progress Note    Patient Name: Alyssa Painter  MRN: [id number]  Admission Date: 5/30/2024  Hospital Length of Stay: 3 days  Attending Physician: James Bonds MD  Primary Care Provider: Dorothy, Primary Doctor    Subjective:     Principal Problem:Prolonged premature rupture of membranes    Hospital course: Pt admitted, cerclage removed with pus and malodor from vagina noted by staff.  Pt has been on IV abx since admission. She has been afebrile since admission.   Baby remains in NICU.     Interval History:     She is doing well this morning. She is tolerating a regular diet without nausea or vomiting. She is voiding spontaneously. She is ambulating. Vaginal bleeding is mild. She denies fever or chills. Abdominal pain is mild and controlled with oral medications. She Is breastfeeding.      Objective:     Vital Signs (Most Recent):  Temp: 97.8 °F (36.6 °C) (06/02/24 0710)  Pulse: 76 (06/02/24 0710)  Resp: 20 (06/02/24 0710)  BP: (!) 97/53 (06/02/24 0710)  SpO2: 98 % (06/02/24 0710) Vital Signs (24h Range):  Temp:  [97.8 °F (36.6 °C)-98.1 °F (36.7 °C)] 97.8 °F (36.6 °C)  Pulse:  [76-96] 76  Resp:  [18-20] 20  SpO2:  [95 %-99 %] 98 %  BP: ()/(51-57) 97/53     Weight: 117 kg (258 lb)  Body mass index is 47.19 kg/m².    No intake or output data in the 24 hours ending 06/02/24 0959    Physical Exam    Significant Labs:  Lab Results   Component Value Date    GROUPTRH O POS 05/30/2024    HEPBSAG Non-Reactive 05/30/2024     Recent Labs   Lab 06/01/24  0515   HGB 9.2*   HCT 28.6*       CBC:   Recent Labs   Lab 06/01/24  0515   WBC 18.02*   RBC 3.25*   HGB 9.2*   HCT 28.6*      MCV 88.0   MCH 28.3   MCHC 32.2     CMP: No results for input(s): "GLU", "CALCIUM", "ALBUMIN", "PROT", "NA", "K", "CO2", "CL", "BUN", "CREATININE", "ALKPHOS", "ALT", "AST", "BILITOT" in the last 48 hours.  I have personallly reviewed all pertinent lab results from the last 24 " hours.    Assessment/Plan:     29 y.o. female  at 32w0d for:    Active Diagnoses:    Diagnosis Date Noted POA    PRINCIPAL PROBLEM:  Prolonged premature rupture of membranes [O42.10] 2024 Unknown    Chorioamnionitis due to bacteria [O41.1290, B96.89] 2024 Unknown     (normal spontaneous vaginal delivery) [O80] 2024 Not Applicable    32 weeks gestation of pregnancy [Z3A.32] 2024 Not Applicable     labor in third trimester without delivery [O60.03] 2024 Yes    31 weeks gestation of pregnancy [Z3A.31] 2024 Not Applicable      Problems Resolved During this Admission:       Plan 1 more day of IV abx in light of situation at admission.     Disposition: As patient meets milestones, will plan to discharge tomorrow.    Makayla Beckham, DO  Obstetrics  [facility] -  Labor and Delivery

## 2024-06-02 NOTE — DISCHARGE INSTRUCTIONS
PP Education Booklet Given ---- pe 06/02/24 at 08:26 Discharge book given. All discharge instructions given,verbalized understanding with PP booklet given on 06/02/24 at 08:26   Skin to skin 18 pe     Rooming in 29 pe     Importance of Exclusive Breastfeeding for 6 mo 35 pe     Bleeding 6 pe     Incision Care 10 pe     Sex 11 pe     Pain Management 8 pe     Perineal Care 9 pe     Minimizing Engorgement 40 pe     Collection & Storage of BM 42-43 pe     S/S of BF Problems  pe     Hand Expression 42 pe     Maternal s/s breast problems 41 pe     Mgnt of Common BF Problems (engorgement, sore & cracked nipples) 40-41 pe     PPD/Anxiety 14-15 pe

## 2024-06-03 VITALS
HEIGHT: 62 IN | RESPIRATION RATE: 20 BRPM | HEART RATE: 78 BPM | WEIGHT: 258 LBS | DIASTOLIC BLOOD PRESSURE: 65 MMHG | SYSTOLIC BLOOD PRESSURE: 121 MMHG | OXYGEN SATURATION: 96 % | BODY MASS INDEX: 47.48 KG/M2 | TEMPERATURE: 98 F

## 2024-06-03 LAB
BACTERIA SPEC ANAEROBE CULT: ABNORMAL
BUN SERPL-MCNC: 12 MG/DL (ref 7–18)
BUN/CREAT SERPL: 14 (ref 6–20)
CREAT SERPL-MCNC: 0.86 MG/DL (ref 0.55–1.02)
EGFR (NO RACE VARIABLE) (RUSH/TITUS): 95 ML/MIN/1.73M2
GENTAMICIN TROUGH SERPL-MCNC: <0.2 ΜG/ML (ref 0.5–1.9)

## 2024-06-03 PROCEDURE — 25000003 PHARM REV CODE 250: Performed by: OBSTETRICS & GYNECOLOGY

## 2024-06-03 PROCEDURE — 3E0134Z INTRODUCTION OF SERUM, TOXOID AND VACCINE INTO SUBCUTANEOUS TISSUE, PERCUTANEOUS APPROACH: ICD-10-PCS | Performed by: OBSTETRICS & GYNECOLOGY

## 2024-06-03 PROCEDURE — 99238 HOSP IP/OBS DSCHRG MGMT 30/<: CPT | Mod: ,,, | Performed by: OBSTETRICS & GYNECOLOGY

## 2024-06-03 PROCEDURE — 63600175 PHARM REV CODE 636 W HCPCS: Mod: JG | Performed by: OBSTETRICS & GYNECOLOGY

## 2024-06-03 PROCEDURE — 90710 MMRV VACCINE SC: CPT | Mod: JG | Performed by: OBSTETRICS & GYNECOLOGY

## 2024-06-03 PROCEDURE — 63600175 PHARM REV CODE 636 W HCPCS: Performed by: OBSTETRICS & GYNECOLOGY

## 2024-06-03 PROCEDURE — 82565 ASSAY OF CREATININE: CPT | Performed by: OBSTETRICS & GYNECOLOGY

## 2024-06-03 PROCEDURE — 36415 COLL VENOUS BLD VENIPUNCTURE: CPT | Performed by: OBSTETRICS & GYNECOLOGY

## 2024-06-03 PROCEDURE — 90471 IMMUNIZATION ADMIN: CPT | Performed by: OBSTETRICS & GYNECOLOGY

## 2024-06-03 PROCEDURE — 80170 ASSAY OF GENTAMICIN: CPT | Performed by: OBSTETRICS & GYNECOLOGY

## 2024-06-03 RX ADMIN — Medication 1 TABLET: at 08:06

## 2024-06-03 RX ADMIN — ACETAMINOPHEN 650 MG: 325 TABLET ORAL at 08:06

## 2024-06-03 RX ADMIN — ACETAMINOPHEN 650 MG: 325 TABLET ORAL at 01:06

## 2024-06-03 RX ADMIN — MEASLES, MUMPS, AND RUBELLA VIRUS VACCINE LIVE 0.5 ML: 1000; 12500; 1000 INJECTION, POWDER, LYOPHILIZED, FOR SUSPENSION SUBCUTANEOUS at 01:06

## 2024-06-03 RX ADMIN — GENTAMICIN SULFATE 370 MG: 40 INJECTION, SOLUTION INTRAMUSCULAR; INTRAVENOUS at 09:06

## 2024-06-03 NOTE — PROGRESS NOTES
Pharmacy Consult    Consulted to assist in the management of Gent therapy.  Patient is currently receiving Gent 370mg iv qday.  Trough drawn at 0900 on 6/3. Reported as < 0.2 mcg/ml.  BUN/SCR = 12/0.86.  Since trough Is in appropriate range, no changes needed at this time.  Will continue to monitor and make adjustment as necessary.      CODY Grubbs.Ph.

## 2024-06-03 NOTE — PLAN OF CARE
Problem:  Fall Injury Risk  Goal: Absence of Fall, Infant Drop and Related Injury  Outcome: Progressing     Problem: Adult Inpatient Plan of Care  Goal: Plan of Care Review  Outcome: Progressing  Goal: Patient-Specific Goal (Individualized)  Outcome: Progressing  Goal: Absence of Hospital-Acquired Illness or Injury  Outcome: Progressing  Goal: Optimal Comfort and Wellbeing  Outcome: Progressing  Goal: Readiness for Transition of Care  Outcome: Progressing     Problem: Bariatric Environmental Safety  Goal: Safety Maintained with Care  Outcome: Progressing     Problem: Postpartum (Vaginal Delivery)  Goal: Successful Parent Role Transition  Outcome: Progressing  Goal: Hemostasis  Outcome: Progressing  Goal: Absence of Infection Signs and Symptoms  Outcome: Progressing  Goal: Anesthesia/Sedation Recovery  Outcome: Progressing  Goal: Optimal Pain Control and Function  Outcome: Progressing  Goal: Effective Urinary Elimination  Outcome: Progressing

## 2024-06-03 NOTE — PLAN OF CARE
Preparing for discharge  Problem:  Fall Injury Risk  Goal: Absence of Fall, Infant Drop and Related Injury  Outcome: Progressing     Problem: Adult Inpatient Plan of Care  Goal: Plan of Care Review  Outcome: Progressing  Goal: Patient-Specific Goal (Individualized)  Outcome: Progressing  Goal: Absence of Hospital-Acquired Illness or Injury  Outcome: Progressing  Goal: Optimal Comfort and Wellbeing  Outcome: Progressing  Goal: Readiness for Transition of Care  Outcome: Progressing     Problem: Bariatric Environmental Safety  Goal: Safety Maintained with Care  Outcome: Progressing     Problem: Postpartum (Vaginal Delivery)  Goal: Successful Parent Role Transition  Outcome: Progressing  Goal: Hemostasis  Outcome: Progressing  Goal: Absence of Infection Signs and Symptoms  Outcome: Progressing  Goal: Anesthesia/Sedation Recovery  Outcome: Progressing  Goal: Optimal Pain Control and Function  Outcome: Progressing  Goal: Effective Urinary Elimination  Outcome: Progressing   Preparing for discharge

## 2024-06-03 NOTE — PLAN OF CARE
Problem:  Fall Injury Risk  Goal: Absence of Fall, Infant Drop and Related Injury  6/3/2024 1531 by Terra Wilkes RN  Outcome: Met  6/3/2024 0813 by Terra Wilkes RN  Outcome: Progressing     Problem: Adult Inpatient Plan of Care  Goal: Plan of Care Review  6/3/2024 1531 by Terra Wilkes, RN  Outcome: Met  6/3/2024 0813 by Terra Wilkes RN  Outcome: Progressing  Goal: Patient-Specific Goal (Individualized)  6/3/2024 1531 by Terra Wilkes RN  Outcome: Met  6/3/2024 0813 by Terra Wilkes RN  Outcome: Progressing  Goal: Absence of Hospital-Acquired Illness or Injury  6/3/2024 1531 by Terra Wilkes, RN  Outcome: Met  6/3/2024 0813 by Terra Wilkes RN  Outcome: Progressing  Goal: Optimal Comfort and Wellbeing  6/3/2024 1531 by Terra Wilkes RN  Outcome: Met  6/3/2024 0813 by Terra Wilkes RN  Outcome: Progressing  Goal: Readiness for Transition of Care  6/3/2024 1531 by Terra Wilkes, RN  Outcome: Met  6/3/2024 0813 by Terra Wilkes RN  Outcome: Progressing     Problem: Bariatric Environmental Safety  Goal: Safety Maintained with Care  6/3/2024 1531 by Terra Wilkes, RN  Outcome: Met  6/3/2024 0813 by Terra Wilkes RN  Outcome: Progressing     Problem: Labor  Goal: Hemostasis  Outcome: Met  Goal: Stable Fetal Wellbeing  Outcome: Met  Goal: Effective Progression to Delivery  Outcome: Met  Goal: Absence of Infection Signs and Symptoms  Outcome: Met  Goal: Acceptable Pain Control  Outcome: Met  Goal: Normal Uterine Contraction Pattern  Outcome: Met     Problem: Prelabor Rupture of Membranes  Goal: Delayed Delivery with Absence of Infection  Outcome: Met     Problem: Postpartum (Vaginal Delivery)  Goal: Successful Parent Role Transition  6/3/2024 1531 by Terra Wilkes, RN  Outcome: Met  6/3/2024 0813 by Terra Wilkes RN  Outcome: Progressing  Goal: Hemostasis  6/3/2024 1531 by Terra Wilkes, RN  Outcome: Met  6/3/2024 0813 by Terra Wilkes,  RN  Outcome: Progressing  Goal: Absence of Infection Signs and Symptoms  6/3/2024 1531 by Terra Wilkes, RN  Outcome: Met  6/3/2024 0813 by Terra Wilkes, RN  Outcome: Progressing  Goal: Anesthesia/Sedation Recovery  6/3/2024 1531 by Terra Wilkes, RN  Outcome: Met  6/3/2024 0813 by Terra Wilkes, RN  Outcome: Progressing  Goal: Optimal Pain Control and Function  6/3/2024 1531 by Terra Wilkes, RN  Outcome: Met  6/3/2024 0813 by Terra Wilkes, RN  Outcome: Progressing  Goal: Effective Urinary Elimination  6/3/2024 1531 by Terra Wilkes, RN  Outcome: Met  6/3/2024 0813 by Terra Wilkes, RN  Outcome: Progressing

## 2024-06-04 LAB
BACTERIA SPEC ANAEROBE CULT: ABNORMAL
BACTERIA SPEC ANAEROBE CULT: ABNORMAL
ESTROGEN SERPL-MCNC: NORMAL PG/ML
INSULIN SERPL-ACNC: NORMAL U[IU]/ML
LAB AP CLINICAL INFORMATION: NORMAL
LAB AP GESTATIONAL AGE: NORMAL
LAB AP GROSS DESCRIPTION: NORMAL
LAB AP LABORATORY NOTES: NORMAL
T3RU NFR SERPL: NORMAL %

## 2024-06-04 NOTE — PLAN OF CARE
[facility] -  Labor and Delivery  Discharge Final Note    Primary Care Provider: No, Primary Doctor    Expected Discharge Date: 6/3/2024    Final Discharge Note (most recent)       Final Note - 06/04/24 0838          Final Note    Assessment Type Final Discharge Note     Anticipated Discharge Disposition Home or Self Care                     Important Message from Medicare             Contact Info       Amol Romero MD   Specialty: Obstetrics and Gynecology    [address] [place] [address]   Phone: [phone]       Next Steps: Follow up    Amol Romero MD   Specialty: Obstetrics and Gynecology    [address] [place] [address]   Phone: [phone]       Next Steps: Follow up        Pt dc'd home 6/3/24, faxed clinicals to tyrell coats except there is no dc summary available to fax

## 2024-06-07 NOTE — PROCEDURES
Ultrasound note:     Estimated gestational age 28 weeks 4 day   Estimated delivery July 26, 2024   Fetal position vertex  Placenta posterior  Fetal heart rate 130 beats minute  Cervical length 1.9 cm  Cerclage in place

## 2024-06-10 LAB — BACTERIA SPEC ANAEROBE CULT: NORMAL

## 2024-06-10 NOTE — ANESTHESIA POSTPROCEDURE EVALUATION
Anesthesia Post Evaluation    Patient: Alyssa Painter    Procedure(s) Performed: * No procedures listed *    Final Anesthesia Type: epidural      Patient location during evaluation: labor & delivery  Post-procedure vital signs: reviewed and stable  Pain management: adequate  Airway patency: patent  DARIANA mitigation strategies: Use of major conduction anesthesia (spinal/epidural) or peripheral nerve block  PONV status at discharge: No PONV  Anesthetic complications: no      Cardiovascular status: hemodynamically stable  Respiratory status: unassisted  Hydration status: euvolemic  Follow-up not needed.              Vitals Value Taken Time   BP  06/10/24 1628   Temp  06/10/24 1628   Pulse  06/10/24 1628   Resp  06/10/24 1628   SpO2  06/10/24 1628         No case tracking events are documented in the log.      Pain/Go Score: No data recorded

## 2024-06-17 NOTE — HOSPITAL COURSE
This is a 29-year-old  admitted at 32 weeks 0 day secondary to  premature spontaneous rupture membranes.  Patient had cerclage in place that was removed by the OB hospitalist.  Patient subsequently delivered a live-born male baby via spontaneous vaginal delivery weighing 4 lb 9 oz with Apgars of 8 and 9.  The patient's hospital course was unremarkable by postpartum day 2. She was ready for discharge.  Disposition was tone.  Condition stable.  Patient was breastfeeding and undecided regarding her method of contraception.

## 2024-06-17 NOTE — DISCHARGE SUMMARY
[facility] -  Labor [facility] Delivery  Obstetrics  Discharge Summary      Patient Name: Alyssa Painter  MRN: [id number]  Admission Date: 2024  Hospital Length of Stay: 4 days  Discharge Date and Time: 6/3/2024  1:37 PM  Attending Physician: Dorothy att. providers found   Discharging Provider: Amol Romero MD   Primary Care Provider: Dorothy, Primary Doctor    HPI: Patient is a 29-year-old  who presents at 31 weeks 6 days with complaints of uterine contractions leakage of amniotic fluid and vaginal bleeding for the past 24 hours she had a cerclage placed at 27 weeks gestation by Dr. Coleman.  This cerclage was removed in the OB ED with grossly ruptured membranes and vaginal bleeding noted.  Once the cerclage was removed the cervix was inspected and good hemostasis was noted.  Patient is 3 cm 90% and -2.  She is being admitted for delivery.        * No surgery found *     Hospital Course:   This is a 29-year-old  admitted at 32 weeks 0 day secondary to  premature spontaneous rupture membranes.  Patient had cerclage in place that was removed by the OB hospitalist.  Patient subsequently delivered a live-born male baby via spontaneous vaginal delivery weighing 4 lb 9 oz with Apgars of 8 and 9.  The patient's hospital course was unremarkable by postpartum day 2. She was ready for discharge.  Disposition was tone.  Condition stable.  Patient was breastfeeding and undecided regarding her method of contraception.         Final Active Diagnoses:    Diagnosis Date Noted POA    PRINCIPAL PROBLEM:  Prolonged premature rupture of membranes [O42.10] 2024 Unknown    Chorioamnionitis due to bacteria [O41.1290, B96.89] 2024 Unknown     (normal spontaneous vaginal delivery) [O80] 2024 Not Applicable    32 weeks gestation of pregnancy [Z3A.32] 2024 Not Applicable     labor in third trimester without delivery [O60.03] 2024 Yes    31 weeks gestation of pregnancy [Z3A.31]  "2024 Not Applicable      Problems Resolved During this Admission:        Significant Diagnostic Studies: N/A      Feeding Method: breast    Immunizations       None            Delivery:    Episiotomy: None   Lacerations: None   Repair suture:     Repair # of packets:     Blood loss (ml):       Birth information:  YOB: 2023   Time of birth: 9:42 AM   Sex: male   Delivery type: Vaginal, Spontaneous   Gestational Age: 32w0d     Measurements    Weight: 1859 g  Weight (lbs): 4 lb 1.6 oz  Length: 47 cm  Length (in): 18.5"  Head circumference: 29 cm  Chest circumference: 27 cm  Abdominal girth: 26 cm  Birth comments: chorio         Delivery Clinician: Delivery Providers    Delivering clinician: Lorenzo Mosher MD   Provider Role    Fabricio Dc ST Sanders, Doris J, ST              Additional  information:  Forceps:    Vacuum:    Breech:    Observed anomalies chorio     Living?:     Apgars    Living status: Living  Apgar Component Scores:  1 min.:  5 min.:  10 min.:  15 min.:  20 min.:    Skin color:  0  1       Heart rate:  2  2       Reflex irritability:  2  2       Muscle tone:  2  2       Respiratory effort:  2  2       Total:  8  9       Apgars assigned by: ABDI OLIVAS         Placenta: Delivered:       appearance  Pending Diagnostic Studies:       None            Discharged Condition: good    Disposition: Home or Self Care    Follow Up:   Follow-up Information       Amol Romero MD .    Specialty: Obstetrics and Gynecology  Contact information:  [address] [place] [address]  [phone]               Amol Romero MD .    Specialty: Obstetrics and Gynecology  Contact information:  [address] [place] [address]  [phone]                           Patient Instructions:   No discharge procedures on file.  Medications:  Discharge Medication List as of 2024  8:41 AM        CONTINUE these medications " which have NOT CHANGED    Details   ondansetron (ZOFRAN) 4 MG tablet Take 1 tablet (4 mg total) by mouth every 6 (six) hours as needed for Nausea., Starting Tue 5/7/2024, Normal      prenatal no115/iron/folic acid (PRENATAL 19 ORAL) Take by mouth., Historical Med             Amol Romero MD  Obstetrics  [facility] -  Labor and Delivery

## 2024-06-17 NOTE — DISCHARGE SUMMARY
[facility]  [facility]  Obstetrics  Discharge Summary      Patient Name: Alyssa Painter  MRN: [id number]  Admission Date: 2024  Hospital Length of Stay: 4 days  Discharge Date and Time: 6/3/2024  1:37 PM  Attending Physician: Dorothy att. providers found   Discharging Provider: Amol Romero MD   Primary Care Provider: Dorothy, Primary Doctor    HPI: Patient is a 29-year-old  who presents at 31 weeks 6 days with complaints of uterine contractions leakage of amniotic fluid and vaginal bleeding for the past 24 hours she had a cerclage placed at 27 weeks gestation by Dr. Coleman.  This cerclage was removed in the OB ED with grossly ruptured membranes and vaginal bleeding noted.  Once the cerclage was removed the cervix was inspected and good hemostasis was noted.  Patient is 3 cm 90% and -2.  She is being admitted for delivery.        * No surgery found *     Hospital Course:   This is a 29-year-old  admitted at 32 weeks 0 day secondary to  premature spontaneous rupture membranes.  Patient had cerclage in place that was removed by the OB hospitalist.  Patient subsequently delivered a live-born male baby via spontaneous vaginal delivery weighing 4 lb 9 oz with Apgars of 8 and 9.  The patient's hospital course was unremarkable by postpartum day 2. She was ready for discharge.  Disposition was tone.  Condition stable.  Patient was breastfeeding and undecided regarding her method of contraception.         Final Active Diagnoses:    Diagnosis Date Noted POA    PRINCIPAL PROBLEM:  Prolonged premature rupture of membranes [O42.10] 2024 Unknown    Chorioamnionitis due to bacteria [O41.1290, B96.89] 2024 Unknown     (normal spontaneous vaginal delivery) [O80] 2024 Not Applicable    32 weeks gestation of pregnancy [Z3A.32] 2024 Not Applicable     labor in third trimester without delivery [O60.03] 2024 Yes    31 weeks gestation of pregnancy [Z3A.31]  "2024 Not Applicable      Problems Resolved During this Admission:        Significant Diagnostic Studies: N/A      Feeding Method: breast    Immunizations       None            Delivery:    Episiotomy: None   Lacerations: None   Repair suture:     Repair # of packets:     Blood loss (ml):       Birth information:  YOB: 2023   Time of birth: 9:42 AM   Sex: male   Delivery type: Vaginal, Spontaneous   Gestational Age: 32w0d     Measurements    Weight: 1859 g  Weight (lbs): 4 lb 1.6 oz  Length: 47 cm  Length (in): 18.5"  Head circumference: 29 cm  Chest circumference: 27 cm  Abdominal girth: 26 cm  Birth comments: chorio         Delivery Clinician: Delivery Providers    Delivering clinician: Lorenzo Mosher MD   Provider Role    Fabricio Dc ST Sanders, Doris J, ST              Additional  information:  Forceps:    Vacuum:    Breech:    Observed anomalies chorio     Living?:     Apgars    Living status: Living  Apgar Component Scores:  1 min.:  5 min.:  10 min.:  15 min.:  20 min.:    Skin color:  0  1       Heart rate:  2  2       Reflex irritability:  2  2       Muscle tone:  2  2       Respiratory effort:  2  2       Total:  8  9       Apgars assigned by: ABDI OLIVAS         Placenta: Delivered:       appearance  Pending Diagnostic Studies:       None            Discharged Condition: good    Disposition: Home or Self Care    Follow Up:   Follow-up Information       Amol Romero MD .    Specialty: Obstetrics and Gynecology  Contact information:  [address] [place] [address]  [phone]               Amol Romero MD .    Specialty: Obstetrics and Gynecology  Contact information:  [address] [place] [address]  [phone]                           Patient Instructions:   No discharge procedures on file.  Medications:  Discharge Medication List as of 2024  8:41 AM        CONTINUE these medications " which have NOT CHANGED    Details   ondansetron (ZOFRAN) 4 MG tablet Take 1 tablet (4 mg total) by mouth every 6 (six) hours as needed for Nausea., Starting Tue 5/7/2024, Normal      prenatal no115/iron/folic acid (PRENATAL 19 ORAL) Take by mouth., Historical Med             Amol Romero MD  Obstetrics  [facility] -  Labor and Delivery

## 2024-07-03 ENCOUNTER — POSTPARTUM VISIT (OUTPATIENT)
Dept: OBSTETRICS AND GYNECOLOGY | Facility: CLINIC | Age: 29
End: 2024-07-03
Payer: COMMERCIAL

## 2024-07-03 VITALS
DIASTOLIC BLOOD PRESSURE: 66 MMHG | HEART RATE: 60 BPM | SYSTOLIC BLOOD PRESSURE: 113 MMHG | BODY MASS INDEX: 46.05 KG/M2 | WEIGHT: 251.81 LBS

## 2024-07-03 DIAGNOSIS — Z30.02: ICD-10-CM

## 2024-07-03 PROCEDURE — 1111F DSCHRG MED/CURRENT MED MERGE: CPT | Mod: ,,, | Performed by: OBSTETRICS & GYNECOLOGY

## 2024-07-03 PROCEDURE — 99214 OFFICE O/P EST MOD 30 MIN: CPT | Mod: ,,, | Performed by: OBSTETRICS & GYNECOLOGY

## 2024-07-03 RX ORDER — METOCLOPRAMIDE 10 MG/1
10 TABLET ORAL
Qty: 90 TABLET | Refills: 1 | Status: SHIPPED | OUTPATIENT
Start: 2024-07-03 | End: 2025-07-03

## 2024-07-03 NOTE — PROGRESS NOTES
Postpartum Visit  Alyssa Painter is a 29 y.o. female  is here for a postpartum visit. She is 4 weeks postpartum following a spontaneous vaginal delivery, of a male infant weighin lb 1.6 oz, with Anesthesia: epidural. . The delivery was at 32 w 0 d.     Pregnancy was complicated by: early delivery.        Postpartum course has been uncomplicated.  Bleeding staining only. Bowel/ bladder function is normal. Her last pap was 2023.  Patient is not sexually active. Desired contraception method is none.   Postpartum depression screening: negative.  Baby's course has been uncomplicated. Baby is feeding by breast.     OB History    Para Term  AB Living   1 1   1   1   SAB IAB Ectopic Multiple Live Births         0 1      # Outcome Date GA Lbr Michael/2nd Weight Sex Type Anes PTL Lv   1  24 32w0d 13:06 / 00:36 1.859 kg (4 lb 1.6 oz) M Vag-Spont EPI Y NNEKA      Birth Comments: chorio         Review of patient's allergies indicates:   Allergen Reactions    Penicillin      Pt states she has been allergic since childhood.       History reviewed. No pertinent past medical history.  Past Surgical History:   Procedure Laterality Date    CERVICAL CERCLAGE N/A 2024    Procedure: CERCLAGE, CERVIX;  Surgeon: Amol Romero MD;  Location: [facility];  Service: OB/GYN;  Laterality: N/A;     OB History          1    Para   1    Term           1    AB        Living   1         SAB        IAB        Ectopic        Multiple   0    Live Births   1               No family history on file.  Social History     Tobacco Use    Smoking status: Never     Passive exposure: Never    Smokeless tobacco: Never   Substance Use Topics    Alcohol use: Never    Drug use: Never       Current Outpatient Medications   Medication Sig    metoclopramide HCl (REGLAN) 10 MG tablet Take 1 tablet (10 mg total) by mouth 3 (three) times daily with meals.    ondansetron (ZOFRAN) 4 MG tablet Take 1 tablet (4  "mg total) by mouth every 6 (six) hours as needed for Nausea. (Patient not taking: Reported on 7/3/2024)    prenatal no115/iron/folic acid (PRENATAL 19 ORAL) Take by mouth. (Patient not taking: Reported on 7/3/2024)     No current facility-administered medications for this visit.         ROS:  GENERAL: No fever, chills, fatigability.  VULVAR: No pain, no lesions and no itching.  VAGINAL: No relaxation, no itching, no discharge, no abnormal bleeding and no lesions.  ABDOMEN: No abdominal pain. Denies nausea. Denies vomiting. No diarrhea. No constipation  BREAST: Denies pain. No lumps. No discharge.  URINARY: No incontinence, no nocturia, no frequency and no dysuria.  CARDIOVASCULAR: No chest pain. No shortness of breath. No leg cramps.  NEUROLOGICAL: No headaches. No vision changes.      General appearance - alert, well appearing, and in no distress  Mental status - alert, oriented to person, place, and time  Skin - coloration normal for race, good turgor, warm to touch, no rashes  Abdomen - soft, nontender, nondistended, no masses or organomegaly  Pelvic -   External genitalia postpartum: normal, well-healed, without lesions or masses.  Normal female hair distribution. Adequate perineal body. Urethral meatus without lesions or prolapse. Urethra: no masses, tenderness, or scarring.  Bladder: without tenderness or masses.  Vaginal mucosa moist and pink, normal rugae, without lesions, abnormal discharge, or foul odor.  Cervix pink, no lesions, no cervical motion tenderness.  Uterus: midline, non tender, smooth, not enlarged, not prolapsed  No adnexal masses or tenderness.  Extremities - no edema, redness or tenderness in the calves or thighs      Alyssa Matos" was seen today for postpartum care.    Diagnoses and all orders for this visit:    Postpartum care following vaginal delivery    Family planning, natural methods to avoid pregnancy    Other orders  -     metoclopramide HCl (REGLAN) 10 MG tablet; Take 1 " tablet (10 mg total) by mouth 3 (three) times daily with meals.        Discussed contraception - pt desires n/a  Counseling regarding resuming normal activities of exercise and work.  Postpartum precautions reviewed    Routine follow up in 1 yr

## 2024-07-12 ENCOUNTER — TELEPHONE (OUTPATIENT)
Dept: OBSTETRICS AND GYNECOLOGY | Facility: CLINIC | Age: 29
End: 2024-07-12
Payer: COMMERCIAL

## 2024-07-12 NOTE — TELEPHONE ENCOUNTER
----- Message from Ev Kitchen MA sent at 7/3/2024 12:08 PM CDT -----  Pt wants to go back to work on 7/22, needs release

## 2024-07-12 NOTE — LETTER
July 12, 2024      [facility] - OB/GYN  [address] [address]  Phone: [phone]  Fax: [phone]       Patient: Alyssa Painter   YOB: 1995  Date of Visit: 07/12/2024    To Whom It May Concern:    Alia Painter  was at [facility] on 07/12/2024. The patient may return to work/school on 07/22/2024 with no restrictions. If you have any questions or concerns, or if I can be of further assistance, please do not hesitate to contact me.    Sincerely,    Bebe Villanueva LPN

## (undated) DEVICE — SUT MERSILENE 5-0 RS22

## (undated) DEVICE — GLOVE SENSICARE PI SURG 7

## (undated) DEVICE — GLOVE SENSICARE PI GRN 6.5

## (undated) DEVICE — TRAY VAG PREP

## (undated) DEVICE — SPONGE GAUZE 16PLY 4X4

## (undated) DEVICE — GLOVE SENSICARE PI SURG 6.5

## (undated) DEVICE — KIT LITHOTOMY RUSH